# Patient Record
Sex: FEMALE | Race: BLACK OR AFRICAN AMERICAN | NOT HISPANIC OR LATINO | URBAN - METROPOLITAN AREA
[De-identification: names, ages, dates, MRNs, and addresses within clinical notes are randomized per-mention and may not be internally consistent; named-entity substitution may affect disease eponyms.]

---

## 2019-02-20 ENCOUNTER — EMERGENCY (EMERGENCY)
Facility: HOSPITAL | Age: 59
LOS: 1 days | Discharge: HOME | End: 2019-02-20
Attending: EMERGENCY MEDICINE | Admitting: EMERGENCY MEDICINE
Payer: COMMERCIAL

## 2019-02-20 VITALS
HEART RATE: 71 BPM | TEMPERATURE: 98 F | WEIGHT: 199.96 LBS | SYSTOLIC BLOOD PRESSURE: 118 MMHG | OXYGEN SATURATION: 99 % | DIASTOLIC BLOOD PRESSURE: 87 MMHG | HEIGHT: 62 IN | RESPIRATION RATE: 16 BRPM

## 2019-02-20 VITALS
HEART RATE: 75 BPM | SYSTOLIC BLOOD PRESSURE: 150 MMHG | TEMPERATURE: 98 F | OXYGEN SATURATION: 100 % | RESPIRATION RATE: 18 BRPM | DIASTOLIC BLOOD PRESSURE: 89 MMHG

## 2019-02-20 DIAGNOSIS — R10.84 GENERALIZED ABDOMINAL PAIN: ICD-10-CM

## 2019-02-20 DIAGNOSIS — Z79.891 LONG TERM (CURRENT) USE OF OPIATE ANALGESIC: ICD-10-CM

## 2019-02-20 DIAGNOSIS — Z79.82 LONG TERM (CURRENT) USE OF ASPIRIN: ICD-10-CM

## 2019-02-20 DIAGNOSIS — J44.9 CHRONIC OBSTRUCTIVE PULMONARY DISEASE, UNSPECIFIED: ICD-10-CM

## 2019-02-20 DIAGNOSIS — S39.91XD: Chronic | ICD-10-CM

## 2019-02-20 DIAGNOSIS — K80.20 CALCULUS OF GALLBLADDER WITHOUT CHOLECYSTITIS WITHOUT OBSTRUCTION: ICD-10-CM

## 2019-02-20 DIAGNOSIS — R94.5 ABNORMAL RESULTS OF LIVER FUNCTION STUDIES: ICD-10-CM

## 2019-02-20 DIAGNOSIS — Z79.899 OTHER LONG TERM (CURRENT) DRUG THERAPY: ICD-10-CM

## 2019-02-20 LAB
ALBUMIN SERPL ELPH-MCNC: 4.9 G/DL — SIGNIFICANT CHANGE UP (ref 3.3–5)
ALP SERPL-CCNC: 160 U/L — HIGH (ref 40–120)
ALT FLD-CCNC: 651 U/L — HIGH (ref 10–45)
ANION GAP SERPL CALC-SCNC: 12 MMOL/L — SIGNIFICANT CHANGE UP (ref 5–17)
APPEARANCE UR: CLEAR — SIGNIFICANT CHANGE UP
APTT BLD: 28.1 SEC — SIGNIFICANT CHANGE UP (ref 27.5–36.3)
AST SERPL-CCNC: 564 U/L — HIGH (ref 10–40)
BACTERIA # UR AUTO: SIGNIFICANT CHANGE UP /HPF
BASOPHILS # BLD AUTO: 0.04 K/UL — SIGNIFICANT CHANGE UP (ref 0–0.2)
BASOPHILS NFR BLD AUTO: 0.6 % — SIGNIFICANT CHANGE UP (ref 0–2)
BILIRUB SERPL-MCNC: 0.8 MG/DL — SIGNIFICANT CHANGE UP (ref 0.2–1.2)
BILIRUB UR-MCNC: NEGATIVE — SIGNIFICANT CHANGE UP
BUN SERPL-MCNC: 10 MG/DL — SIGNIFICANT CHANGE UP (ref 7–23)
CALCIUM SERPL-MCNC: 9.6 MG/DL — SIGNIFICANT CHANGE UP (ref 8.4–10.5)
CHLORIDE SERPL-SCNC: 109 MMOL/L — HIGH (ref 96–108)
CO2 SERPL-SCNC: 22 MMOL/L — SIGNIFICANT CHANGE UP (ref 22–31)
COLOR SPEC: YELLOW — SIGNIFICANT CHANGE UP
CREAT SERPL-MCNC: 0.75 MG/DL — SIGNIFICANT CHANGE UP (ref 0.5–1.3)
DIFF PNL FLD: ABNORMAL
EOSINOPHIL # BLD AUTO: 0.19 K/UL — SIGNIFICANT CHANGE UP (ref 0–0.5)
EOSINOPHIL NFR BLD AUTO: 2.8 % — SIGNIFICANT CHANGE UP (ref 0–6)
EPI CELLS # UR: ABNORMAL /HPF (ref 0–5)
GLUCOSE SERPL-MCNC: 110 MG/DL — HIGH (ref 70–99)
GLUCOSE UR QL: NEGATIVE — SIGNIFICANT CHANGE UP
HCT VFR BLD CALC: 41.7 % — SIGNIFICANT CHANGE UP (ref 34.5–45)
HGB BLD-MCNC: 14.1 G/DL — SIGNIFICANT CHANGE UP (ref 11.5–15.5)
IMM GRANULOCYTES NFR BLD AUTO: 0.4 % — SIGNIFICANT CHANGE UP (ref 0–1.5)
INR BLD: 1.01 — SIGNIFICANT CHANGE UP (ref 0.88–1.16)
KETONES UR-MCNC: NEGATIVE — SIGNIFICANT CHANGE UP
LEUKOCYTE ESTERASE UR-ACNC: NEGATIVE — SIGNIFICANT CHANGE UP
LIDOCAIN IGE QN: 18 U/L — SIGNIFICANT CHANGE UP (ref 7–60)
LYMPHOCYTES # BLD AUTO: 1.79 K/UL — SIGNIFICANT CHANGE UP (ref 1–3.3)
LYMPHOCYTES # BLD AUTO: 26.2 % — SIGNIFICANT CHANGE UP (ref 13–44)
MCHC RBC-ENTMCNC: 30.1 PG — SIGNIFICANT CHANGE UP (ref 27–34)
MCHC RBC-ENTMCNC: 33.8 GM/DL — SIGNIFICANT CHANGE UP (ref 32–36)
MCV RBC AUTO: 88.9 FL — SIGNIFICANT CHANGE UP (ref 80–100)
MONOCYTES # BLD AUTO: 0.42 K/UL — SIGNIFICANT CHANGE UP (ref 0–0.9)
MONOCYTES NFR BLD AUTO: 6.1 % — SIGNIFICANT CHANGE UP (ref 2–14)
NEUTROPHILS # BLD AUTO: 4.36 K/UL — SIGNIFICANT CHANGE UP (ref 1.8–7.4)
NEUTROPHILS NFR BLD AUTO: 63.9 % — SIGNIFICANT CHANGE UP (ref 43–77)
NITRITE UR-MCNC: NEGATIVE — SIGNIFICANT CHANGE UP
NRBC # BLD: 0 /100 WBCS — SIGNIFICANT CHANGE UP (ref 0–0)
PH UR: 6 — SIGNIFICANT CHANGE UP (ref 5–8)
PLATELET # BLD AUTO: 217 K/UL — SIGNIFICANT CHANGE UP (ref 150–400)
POTASSIUM SERPL-MCNC: 4.6 MMOL/L — SIGNIFICANT CHANGE UP (ref 3.5–5.3)
POTASSIUM SERPL-SCNC: 4.6 MMOL/L — SIGNIFICANT CHANGE UP (ref 3.5–5.3)
PROT SERPL-MCNC: 8.4 G/DL — HIGH (ref 6–8.3)
PROT UR-MCNC: NEGATIVE MG/DL — SIGNIFICANT CHANGE UP
PROTHROM AB SERPL-ACNC: 11.4 SEC — SIGNIFICANT CHANGE UP (ref 10–12.9)
RBC # BLD: 4.69 M/UL — SIGNIFICANT CHANGE UP (ref 3.8–5.2)
RBC # FLD: 13.6 % — SIGNIFICANT CHANGE UP (ref 10.3–14.5)
RBC CASTS # UR COMP ASSIST: < 5 /HPF — SIGNIFICANT CHANGE UP
SODIUM SERPL-SCNC: 143 MMOL/L — SIGNIFICANT CHANGE UP (ref 135–145)
SP GR SPEC: 1.01 — SIGNIFICANT CHANGE UP (ref 1–1.03)
UROBILINOGEN FLD QL: 0.2 E.U./DL — SIGNIFICANT CHANGE UP
WBC # BLD: 6.83 K/UL — SIGNIFICANT CHANGE UP (ref 3.8–10.5)
WBC # FLD AUTO: 6.83 K/UL — SIGNIFICANT CHANGE UP (ref 3.8–10.5)
WBC UR QL: < 5 /HPF — SIGNIFICANT CHANGE UP

## 2019-02-20 PROCEDURE — 74177 CT ABD & PELVIS W/CONTRAST: CPT | Mod: 26

## 2019-02-20 PROCEDURE — 80053 COMPREHEN METABOLIC PANEL: CPT

## 2019-02-20 PROCEDURE — 74177 CT ABD & PELVIS W/CONTRAST: CPT

## 2019-02-20 PROCEDURE — 85610 PROTHROMBIN TIME: CPT

## 2019-02-20 PROCEDURE — 81001 URINALYSIS AUTO W/SCOPE: CPT

## 2019-02-20 PROCEDURE — 99285 EMERGENCY DEPT VISIT HI MDM: CPT | Mod: 25

## 2019-02-20 PROCEDURE — 96374 THER/PROPH/DIAG INJ IV PUSH: CPT | Mod: XU

## 2019-02-20 PROCEDURE — 83690 ASSAY OF LIPASE: CPT

## 2019-02-20 PROCEDURE — 96375 TX/PRO/DX INJ NEW DRUG ADDON: CPT

## 2019-02-20 PROCEDURE — 36415 COLL VENOUS BLD VENIPUNCTURE: CPT

## 2019-02-20 PROCEDURE — 85025 COMPLETE CBC W/AUTO DIFF WBC: CPT

## 2019-02-20 PROCEDURE — 99284 EMERGENCY DEPT VISIT MOD MDM: CPT | Mod: 25

## 2019-02-20 PROCEDURE — 80074 ACUTE HEPATITIS PANEL: CPT

## 2019-02-20 PROCEDURE — 93005 ELECTROCARDIOGRAM TRACING: CPT

## 2019-02-20 PROCEDURE — 93010 ELECTROCARDIOGRAM REPORT: CPT

## 2019-02-20 PROCEDURE — 85730 THROMBOPLASTIN TIME PARTIAL: CPT

## 2019-02-20 PROCEDURE — 94640 AIRWAY INHALATION TREATMENT: CPT

## 2019-02-20 RX ORDER — ONDANSETRON 8 MG/1
4 TABLET, FILM COATED ORAL ONCE
Qty: 0 | Refills: 0 | Status: COMPLETED | OUTPATIENT
Start: 2019-02-20 | End: 2019-02-20

## 2019-02-20 RX ORDER — IPRATROPIUM/ALBUTEROL SULFATE 18-103MCG
3 AEROSOL WITH ADAPTER (GRAM) INHALATION EVERY 6 HOURS
Qty: 0 | Refills: 0 | Status: DISCONTINUED | OUTPATIENT
Start: 2019-02-20 | End: 2019-02-24

## 2019-02-20 RX ORDER — FAMOTIDINE 10 MG/ML
20 INJECTION INTRAVENOUS ONCE
Qty: 0 | Refills: 0 | Status: COMPLETED | OUTPATIENT
Start: 2019-02-20 | End: 2019-02-20

## 2019-02-20 RX ORDER — ALBUTEROL 90 UG/1
3 AEROSOL, METERED ORAL
Qty: 1 | Refills: 0
Start: 2019-02-20 | End: 2019-03-21

## 2019-02-20 RX ORDER — IPRATROPIUM/ALBUTEROL SULFATE 18-103MCG
3 AEROSOL WITH ADAPTER (GRAM) INHALATION ONCE
Qty: 0 | Refills: 0 | Status: COMPLETED | OUTPATIENT
Start: 2019-02-20 | End: 2019-02-20

## 2019-02-20 RX ORDER — TIOTROPIUM BROMIDE 18 UG/1
1 CAPSULE ORAL; RESPIRATORY (INHALATION) DAILY
Qty: 0 | Refills: 0 | Status: DISCONTINUED | OUTPATIENT
Start: 2019-02-20 | End: 2019-02-24

## 2019-02-20 RX ORDER — ALBUTEROL 90 UG/1
1 AEROSOL, METERED ORAL EVERY 4 HOURS
Qty: 0 | Refills: 0 | Status: DISCONTINUED | OUTPATIENT
Start: 2019-02-20 | End: 2019-02-24

## 2019-02-20 RX ORDER — FLUTICASONE PROPIONATE AND SALMETEROL 50; 250 UG/1; UG/1
1 POWDER ORAL; RESPIRATORY (INHALATION)
Qty: 1 | Refills: 0
Start: 2019-02-20 | End: 2019-03-21

## 2019-02-20 RX ORDER — SODIUM CHLORIDE 9 MG/ML
1000 INJECTION INTRAMUSCULAR; INTRAVENOUS; SUBCUTANEOUS ONCE
Qty: 0 | Refills: 0 | Status: COMPLETED | OUTPATIENT
Start: 2019-02-20 | End: 2019-02-20

## 2019-02-20 RX ORDER — IOHEXOL 300 MG/ML
30 INJECTION, SOLUTION INTRAVENOUS ONCE
Qty: 0 | Refills: 0 | Status: COMPLETED | OUTPATIENT
Start: 2019-02-20 | End: 2019-02-20

## 2019-02-20 RX ADMIN — SODIUM CHLORIDE 1000 MILLILITER(S): 9 INJECTION INTRAMUSCULAR; INTRAVENOUS; SUBCUTANEOUS at 14:12

## 2019-02-20 RX ADMIN — IOHEXOL 30 MILLILITER(S): 300 INJECTION, SOLUTION INTRAVENOUS at 14:12

## 2019-02-20 RX ADMIN — FAMOTIDINE 20 MILLIGRAM(S): 10 INJECTION INTRAVENOUS at 14:14

## 2019-02-20 RX ADMIN — Medication 3 MILLILITER(S): at 17:10

## 2019-02-20 RX ADMIN — ONDANSETRON 4 MILLIGRAM(S): 8 TABLET, FILM COATED ORAL at 14:12
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
## 2019-02-20 NOTE — ED ADULT NURSE NOTE - PMH
Chronic obstructive pulmonary disease, unspecified COPD type    Trigeminal neuralgia of left side of face
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
## 2019-02-20 NOTE — ED PROVIDER NOTE - PMH
Chronic obstructive pulmonary disease, unspecified COPD type Chronic obstructive pulmonary disease, unspecified COPD type    Trigeminal neuralgia of left side of face

## 2019-02-20 NOTE — ED ADULT NURSE NOTE - CHPI ED NUR SYMPTOMS NEG
no chills/no dysuria/no burning urination/no hematuria/no abdominal distension/no fever/no blood in stool

## 2019-02-20 NOTE — ED PROVIDER NOTE - CARE PLAN
Principal Discharge DX:	Generalized abdominal pain Principal Discharge DX:	Generalized abdominal pain  Secondary Diagnosis:	Elevated liver enzymes Principal Discharge DX:	Generalized abdominal pain  Secondary Diagnosis:	Elevated liver enzymes  Secondary Diagnosis:	Gallstones
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
## 2019-02-20 NOTE — ED PROVIDER NOTE - CLINICAL SUMMARY MEDICAL DECISION MAKING FREE TEXT BOX
59F PMH COPD presents with generalized abdominal pain, with one episode of vomiting yesterday. Abdnominal pain stable during ED visit, physical exam within normal limits. Recevied CT scan of abdomen which was unremarkable, significant only fo r 4mm pulmonary nodule. Able to tolerate food in ED. Discharged with albuterol refill. 59F PMH COPD presents with generalized abdominal pain, with one episode of vomiting yesterday. Abdominal pain stable during ED visit, physical exam within normal limits. Recevied CT scan of abdomen which was unremarkable, significant only fo r 4mm pulmonary nodule. Able to tolerate food in ED. Discharged with albuterol refill 59F PMH COPD presents with generalized abdominal pain, with one episode of vomiting yesterday. Abdominal pain stable during ED visit, physical exam within normal limits. Recevied CT scan of abdomen which demonstated gallstones no intrahepatic ductal dilitation- normal pancreas, and right LL 4mm pulmonary nodule. Able to tolerate food in ED -no N/V --Discharged with albuterol refill

## 2019-02-20 NOTE — ED PROVIDER NOTE - PROGRESS NOTE DETAILS
elev LFTS lipase and T bili negative-  hx of prior heavy etoh use  quit 3 years ago-- hepatitis panel negative- unclear etiology > food bourne ate salmon a few days earlier- pt aware of 4 mm pulm nodule- and need for follow up with her PMD for re-eval elev LFTS lipase and T bili negative-  hx of prior heavy etoh use  quit 3 years ago-- hepatitis panel negative- unclear etiology- ? food bourne ate salmon a few days earlier- but no diarrhea elev LFTS-- lipase and T bili wnl  - gallstones on CT noted  no wall thickening or pericholcystic  fluid---  hx of prior heavy etoh use  quit 3 years ago-- hepatitis panel negative- unclear etiology- ? food bourne ate salmon a few days earlier- but no diarrhea

## 2019-02-21 ENCOUNTER — EMERGENCY (EMERGENCY)
Facility: HOSPITAL | Age: 59
LOS: 1 days | Discharge: HOME | End: 2019-02-21
Attending: EMERGENCY MEDICINE | Admitting: EMERGENCY MEDICINE
Payer: MEDICAID

## 2019-02-21 VITALS
OXYGEN SATURATION: 98 % | TEMPERATURE: 98 F | HEART RATE: 70 BPM | DIASTOLIC BLOOD PRESSURE: 78 MMHG | SYSTOLIC BLOOD PRESSURE: 115 MMHG | RESPIRATION RATE: 16 BRPM

## 2019-02-21 VITALS
OXYGEN SATURATION: 98 % | RESPIRATION RATE: 16 BRPM | DIASTOLIC BLOOD PRESSURE: 80 MMHG | TEMPERATURE: 98 F | SYSTOLIC BLOOD PRESSURE: 120 MMHG | HEART RATE: 72 BPM

## 2019-02-21 DIAGNOSIS — Z79.899 OTHER LONG TERM (CURRENT) DRUG THERAPY: ICD-10-CM

## 2019-02-21 DIAGNOSIS — R94.5 ABNORMAL RESULTS OF LIVER FUNCTION STUDIES: ICD-10-CM

## 2019-02-21 DIAGNOSIS — Z79.891 LONG TERM (CURRENT) USE OF OPIATE ANALGESIC: ICD-10-CM

## 2019-02-21 DIAGNOSIS — J44.9 CHRONIC OBSTRUCTIVE PULMONARY DISEASE, UNSPECIFIED: ICD-10-CM

## 2019-02-21 DIAGNOSIS — S39.91XD: Chronic | ICD-10-CM

## 2019-02-21 DIAGNOSIS — Z79.82 LONG TERM (CURRENT) USE OF ASPIRIN: ICD-10-CM

## 2019-02-21 DIAGNOSIS — R53.1 WEAKNESS: ICD-10-CM

## 2019-02-21 DIAGNOSIS — K80.20 CALCULUS OF GALLBLADDER WITHOUT CHOLECYSTITIS WITHOUT OBSTRUCTION: ICD-10-CM

## 2019-02-21 LAB
ALBUMIN SERPL ELPH-MCNC: 4.1 G/DL — SIGNIFICANT CHANGE UP (ref 3.3–5)
ALP SERPL-CCNC: 139 U/L — HIGH (ref 40–120)
ALT FLD-CCNC: 333 U/L — HIGH (ref 10–45)
ANION GAP SERPL CALC-SCNC: 11 MMOL/L — SIGNIFICANT CHANGE UP (ref 5–17)
APTT BLD: 28.7 SEC — SIGNIFICANT CHANGE UP (ref 27.5–36.3)
AST SERPL-CCNC: 153 U/L — HIGH (ref 10–40)
BASOPHILS # BLD AUTO: 0.04 K/UL — SIGNIFICANT CHANGE UP (ref 0–0.2)
BASOPHILS NFR BLD AUTO: 0.7 % — SIGNIFICANT CHANGE UP (ref 0–2)
BILIRUB SERPL-MCNC: 0.4 MG/DL — SIGNIFICANT CHANGE UP (ref 0.2–1.2)
BUN SERPL-MCNC: 10 MG/DL — SIGNIFICANT CHANGE UP (ref 7–23)
CALCIUM SERPL-MCNC: 9.3 MG/DL — SIGNIFICANT CHANGE UP (ref 8.4–10.5)
CHLORIDE SERPL-SCNC: 107 MMOL/L — SIGNIFICANT CHANGE UP (ref 96–108)
CO2 SERPL-SCNC: 22 MMOL/L — SIGNIFICANT CHANGE UP (ref 22–31)
CREAT SERPL-MCNC: 0.79 MG/DL — SIGNIFICANT CHANGE UP (ref 0.5–1.3)
EOSINOPHIL # BLD AUTO: 0.4 K/UL — SIGNIFICANT CHANGE UP (ref 0–0.5)
EOSINOPHIL NFR BLD AUTO: 6.8 % — HIGH (ref 0–6)
GLUCOSE SERPL-MCNC: 99 MG/DL — SIGNIFICANT CHANGE UP (ref 70–99)
HCT VFR BLD CALC: 36.6 % — SIGNIFICANT CHANGE UP (ref 34.5–45)
HGB BLD-MCNC: 12.1 G/DL — SIGNIFICANT CHANGE UP (ref 11.5–15.5)
IMM GRANULOCYTES NFR BLD AUTO: 0.2 % — SIGNIFICANT CHANGE UP (ref 0–1.5)
INR BLD: 1.02 — SIGNIFICANT CHANGE UP (ref 0.88–1.16)
LYMPHOCYTES # BLD AUTO: 2.43 K/UL — SIGNIFICANT CHANGE UP (ref 1–3.3)
LYMPHOCYTES # BLD AUTO: 41.3 % — SIGNIFICANT CHANGE UP (ref 13–44)
MCHC RBC-ENTMCNC: 30 PG — SIGNIFICANT CHANGE UP (ref 27–34)
MCHC RBC-ENTMCNC: 33.1 GM/DL — SIGNIFICANT CHANGE UP (ref 32–36)
MCV RBC AUTO: 90.6 FL — SIGNIFICANT CHANGE UP (ref 80–100)
MONOCYTES # BLD AUTO: 0.52 K/UL — SIGNIFICANT CHANGE UP (ref 0–0.9)
MONOCYTES NFR BLD AUTO: 8.8 % — SIGNIFICANT CHANGE UP (ref 2–14)
NEUTROPHILS # BLD AUTO: 2.48 K/UL — SIGNIFICANT CHANGE UP (ref 1.8–7.4)
NEUTROPHILS NFR BLD AUTO: 42.2 % — LOW (ref 43–77)
NRBC # BLD: 0 /100 WBCS — SIGNIFICANT CHANGE UP (ref 0–0)
PLATELET # BLD AUTO: 172 K/UL — SIGNIFICANT CHANGE UP (ref 150–400)
POTASSIUM SERPL-MCNC: 4 MMOL/L — SIGNIFICANT CHANGE UP (ref 3.5–5.3)
POTASSIUM SERPL-SCNC: 4 MMOL/L — SIGNIFICANT CHANGE UP (ref 3.5–5.3)
PROT SERPL-MCNC: 6.8 G/DL — SIGNIFICANT CHANGE UP (ref 6–8.3)
PROTHROM AB SERPL-ACNC: 11.5 SEC — SIGNIFICANT CHANGE UP (ref 10–12.9)
RBC # BLD: 4.04 M/UL — SIGNIFICANT CHANGE UP (ref 3.8–5.2)
RBC # FLD: 13.7 % — SIGNIFICANT CHANGE UP (ref 10.3–14.5)
SODIUM SERPL-SCNC: 140 MMOL/L — SIGNIFICANT CHANGE UP (ref 135–145)
WBC # BLD: 5.88 K/UL — SIGNIFICANT CHANGE UP (ref 3.8–10.5)
WBC # FLD AUTO: 5.88 K/UL — SIGNIFICANT CHANGE UP (ref 3.8–10.5)

## 2019-02-21 PROCEDURE — 85730 THROMBOPLASTIN TIME PARTIAL: CPT

## 2019-02-21 PROCEDURE — 85025 COMPLETE CBC W/AUTO DIFF WBC: CPT

## 2019-02-21 PROCEDURE — 76705 ECHO EXAM OF ABDOMEN: CPT

## 2019-02-21 PROCEDURE — 80053 COMPREHEN METABOLIC PANEL: CPT

## 2019-02-21 PROCEDURE — 99284 EMERGENCY DEPT VISIT MOD MDM: CPT

## 2019-02-21 PROCEDURE — 85610 PROTHROMBIN TIME: CPT

## 2019-02-21 PROCEDURE — 76705 ECHO EXAM OF ABDOMEN: CPT | Mod: 26

## 2019-02-21 PROCEDURE — 99284 EMERGENCY DEPT VISIT MOD MDM: CPT | Mod: 25

## 2019-02-21 RX ORDER — SENNA PLUS 8.6 MG/1
0 TABLET ORAL
Qty: 0 | Refills: 0 | COMMUNITY

## 2019-02-21 RX ORDER — LISINOPRIL 2.5 MG/1
1 TABLET ORAL
Qty: 0 | Refills: 0 | COMMUNITY

## 2019-02-21 RX ORDER — GABAPENTIN 400 MG/1
1 CAPSULE ORAL
Qty: 0 | Refills: 0 | COMMUNITY

## 2019-02-21 RX ORDER — GABAPENTIN 400 MG/1
0 CAPSULE ORAL
Qty: 0 | Refills: 0 | COMMUNITY

## 2019-02-21 NOTE — ED ADULT NURSE NOTE - OBJECTIVE STATEMENT
c/o chronic generalized weakness accompanied RLQ pain with n/v/d x 4 days. Denies fever or chills, dysuria, or chest pain.
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
## 2019-02-21 NOTE — ED PROVIDER NOTE - CARE PROVIDERS DIRECT ADDRESSES
[email or web address]

## 2019-02-21 NOTE — ED ADULT NURSE NOTE - PMH
Chronic obstructive pulmonary disease, unspecified COPD type    Stroke    Trigeminal neuralgia of left side of face

## 2019-02-21 NOTE — ED PROVIDER NOTE - NSFOLLOWUPINSTRUCTIONS_ED_ALL_ED_FT
Biliary Colic, Adult  Biliary colic is severe pain caused by a problem with a small organ in the upper right part of your belly (gallbladder). The gallbladder stores a digestive fluid produced in the liver (bile) that helps the body break down fat. Bile and other digestive enzymes are carried from the liver to the small intestine through tube-like structures (bile ducts). The gallbladder and the bile ducts form the biliary tract.    ImageSometimes hard deposits of digestive fluids form in the gallbladder (gallstones) and block the flow of bile from the gallbladder, causing biliary colic. This condition is also called a gallbladder attack. Gallstones can be as small as a grain of sand or as big as a golf ball. There could be just one gallstone in the gallbladder, or there could be many.    What are the causes?  Biliary colic is usually caused by gallstones. Less often, a tumor could block the flow of bile from the gallbladder and trigger biliary colic.    What increases the risk?  This condition is more likely to develop in:    Women.  People of  descent.  People with a family history of gallstones.  People who are obese.  People who suddenly or quickly lose weight.  People who eat a high-calorie, low-fiber diet that is rich in refined carbs (carbohydrates), such as white bread and white rice.  People who have an intestinal disease that affects nutrient absorption, such as Crohn disease.  People who have a metabolic condition, such as metabolic syndrome or diabetes.    What are the signs or symptoms?  Severe pain in the upper right side of the belly is the main symptom of biliary colic. You may feel this pain below the chest but above the hip. This pain often occurs at night or after eating a very fatty meal. This pain may get worse for up to an hour and last as long as 12 hours. In most cases, the pain fades (subsides) within a couple hours.    Other symptoms of this condition include:    Nausea and vomiting.  Pain under the right shoulder.    How is this diagnosed?  This condition is diagnosed based on your medical history, your symptoms, and a physical exam. You may have tests, including:    Blood tests to rule out infection or inflammation of the bile ducts, gallbladder, pancreas, or liver.  Imaging studies such as:    Ultrasound.  CT scan.  MRI.      In some cases, you may need to have an imaging study done using a small amount of radioactive material (nuclear medicine) to confirm the diagnosis.    How is this treated?  Treatment for this condition may include medicine to relieve your pain or nausea. If you have gallstones that are causing biliary colic, you may need surgery to remove the gallbladder (cholecystectomy). Gallstones can also be dissolved gradually with medicine. It may take months or years before the gallstones are completely gone.    Follow these instructions at home:  Take over-the-counter and prescription medicines only as told by your health care provider.  Drink enough fluid to keep your urine clear or pale yellow.  Follow instructions from your health care provider about eating or drinking restrictions. These may include avoiding:    Fatty, greasy, and fried foods.  Any foods that make the pain worse.  Overeating.  Having a large meal after not eating for a while.    Keep all follow-up visits as told by your health care provider. This is important.  How is this prevented?  Steps to prevent this condition include:    Maintaining a healthy body weight.  Getting regular exercise.  Eating a healthy, high-fiber, low-fat diet.  Limiting how much sugar and refined carbs you eat, such as sweets, white flour, and white rice.    Contact a health care provider if:  Your pain lasts more than 5 hours.  You vomit.  You have a fever and chills.  Your pain gets worse.  Get help right away if:  Your skin or the whites of your eyes look yellow (jaundice).  Your have tea-colored urine and light-colored stools.  You are dizzy or you faint.  Summary  Biliary colic is severe pain caused by a problem with a small organ in the upper right part of your belly (gallbladder).  Treatments for this condition include medicines that relieves your pain or nausea and medicines that slowly dissolves the gallstones.  If gallstones cause your biliary colic, the treatment is surgery to remove the gallbladder (cholecystectomy).  This information is not intended to replace advice given to you by your health care provider. Make sure you discuss any questions you have with your health care provider.

## 2019-02-21 NOTE — ED ADULT TRIAGE NOTE - OTHER COMPLAINTS
pt c.o generalized weakness/fatigue. pt seen here last night. pt states "my doctor called me an hour ago and told me to come back because my kidney enzymes are elevated" c/o lower back pain.

## 2019-02-21 NOTE — ED POST DISCHARGE NOTE - RESULT SUMMARY
Elevated LFTS from ED visit 1 day ago- unclear etiology needs repeat enzymes and re-evaluation - had N/V  yesterday

## 2019-02-21 NOTE — CONSULT NOTE ADULT - ASSESSMENT
60 yo F with resolved gastroenteritis and incidental findings of cholelithiasis. Afebrile and hemodynamically stable. No leukocytosis and LFTs are trending down. US revealed cholelithiasis with no clinical sign of acute cholecystitis.     Recs :  No need surgical intervention for now  Please follow up with [facility] clinic to evaluate her gall bladder and for possible elective cholecystectomy  Low fat diet and lifestyle modification   Please come back if +fever, severe abdominal pain, nausea/vomiting    Plan d/w

## 2019-02-21 NOTE — ED ADULT NURSE NOTE - NSIMPLEMENTINTERV_GEN_ALL_ED
Implemented All Universal Safety Interventions:  [place] to call system. Call bell, personal items and telephone within reach. Instruct patient to call for assistance. Room bathroom lighting operational. Non-slip footwear when patient is off stretcher. Physically safe environment: no spills, clutter or unnecessary equipment. Stretcher in lowest position, wheels locked, appropriate side rails in place.
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
## 2019-02-21 NOTE — ED PROVIDER NOTE - CARE PROVIDER_API CALL
Nohemy Nation)  Surgery  [address], [facility] [place] [address]  Phone: [phone]  Fax: [phone]  Follow Up Time:     Greg Alvarez)  Surgery  [address], [facility] [place] [address]  Phone: [phone]  Fax: [phone]  Follow Up Time:
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
## 2019-03-04 ENCOUNTER — APPOINTMENT (OUTPATIENT)
Dept: SURGERY | Facility: CLINIC | Age: 59
End: 2019-03-04
Payer: MEDICAID

## 2019-03-04 PROCEDURE — 99203 OFFICE O/P NEW LOW 30 MIN: CPT

## 2019-03-05 RX ORDER — BACLOFEN 10 MG/1
10 TABLET ORAL
Refills: 0 | Status: ACTIVE | COMMUNITY

## 2019-03-06 PROBLEM — J44.9 CHRONIC OBSTRUCTIVE PULMONARY DISEASE, UNSPECIFIED: Chronic | Status: ACTIVE | Noted: 2019-02-20

## 2019-03-06 PROBLEM — I63.9 CEREBRAL INFARCTION, UNSPECIFIED: Chronic | Status: ACTIVE | Noted: 2019-02-21

## 2019-03-06 PROBLEM — G50.0 TRIGEMINAL NEURALGIA: Chronic | Status: ACTIVE | Noted: 2019-02-20

## 2019-03-15 ENCOUNTER — APPOINTMENT (OUTPATIENT)
Dept: INTERNAL MEDICINE | Facility: CLINIC | Age: 59
End: 2019-03-15
Payer: MEDICAID

## 2019-03-15 VITALS
BODY MASS INDEX: 36.62 KG/M2 | OXYGEN SATURATION: 98 % | HEART RATE: 78 BPM | TEMPERATURE: 97.6 F | HEIGHT: 62 IN | WEIGHT: 199 LBS

## 2019-03-15 PROCEDURE — 99204 OFFICE O/P NEW MOD 45 MIN: CPT

## 2019-03-15 RX ORDER — GABAPENTIN 300 MG/1
300 CAPSULE ORAL
Refills: 0 | Status: DISCONTINUED | COMMUNITY
End: 2019-03-15

## 2019-03-15 RX ORDER — TIOTROPIUM BROMIDE INHALATION SPRAY 3.12 UG/1
2.5 SPRAY, METERED RESPIRATORY (INHALATION) DAILY
Qty: 1 | Refills: 11 | Status: ACTIVE | COMMUNITY
Start: 2019-03-15

## 2019-03-15 RX ORDER — FLUTICASONE PROPIONATE AND SALMETEROL 50; 250 UG/1; UG/1
250-50 POWDER RESPIRATORY (INHALATION)
Qty: 1 | Refills: 5 | Status: ACTIVE | COMMUNITY
Start: 2019-03-15

## 2019-03-17 NOTE — PLAN
[FreeTextEntry1] : Multiple medical issues - REviewed need for medical record release to review previous testing and results and reports\par \par CVA - Memory issues - nuero referral, statin and asa\par Hx of pulmonary nodule- get ct scan for review\par HTN- stable on ACE - ocnintue\par HLD-cont statin\par COPD - will need new Pulm  cont spiriva and albuterol\par f/up 1 month

## 2019-03-17 NOTE — PHYSICAL EXAM
[No Acute Distress] : no acute distress [Well Nourished] : well nourished [Normal Oropharynx] : the oropharynx was normal [Well Developed] : well developed [Supple] : supple [No Lymphadenopathy] : no lymphadenopathy [Clear to Auscultation] : lungs were clear to auscultation bilaterally [No Accessory Muscle Use] : no accessory muscle use [Regular Rhythm] : with a regular rhythm [Normal S1, S2] : normal S1 and S2 [No Murmur] : no murmur heard [No Edema] : there was no peripheral edema [Normal Affect] : the affect was normal
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
## 2019-03-17 NOTE — REVIEW OF SYSTEMS
[Dyspnea on Exertion] : dyspnea on exertion [Abdominal Pain] : abdominal pain [Joint Pain] : joint pain [Memory Loss] : memory loss [Unsteady Walking] : ataxia [Anxiety] : anxiety [Negative] : Respiratory
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
## 2019-04-05 NOTE — ASSESSMENT
[FreeTextEntry1] : Case discussed with attending surgeon. Patient is a 59 year old female who has potential mild gallbladder wall thickening with cholellithiasis seen on her recent abdominal ultrasound, there is no evidence of cholecystitis on CT scan around the same time. SHe has relief with lidocaine. The findings on her ultrasound are unlikely cause of her symptoms. She has been recommended with screening colonoscopy and a referral to Dr. Wadsworth for primary care. She will follow up with us in six months time frame. or sooner if needed. We discussed the signs and symptoms of gallbladder pathology potential related to stones. She expressed understanding. Notably greater than 50% of todays 30 minute initial visit was spent on counseling and coordination of her care.

## 2019-04-05 NOTE — PHYSICAL EXAM
[Normal Breath Sounds] : Normal breath sounds [Normal Heart Sounds] : normal heart sounds [Alert] : alert [Oriented to Person] : oriented to person [Oriented to Place] : oriented to place [Calm] : calm [Tender] : was nontender [Enlarged] : not enlarged [Purpura] : no purpura  [Petechiae] : no petechiae [de-identified] : NAD. Appropriate.Comfortable. [de-identified] : Pupils equal. No scleral icterus. NCAT. [de-identified] : Supple. No overt lymphadenopathy. No JVD. [de-identified] : Abdomen is soft, nontender and non distended. Do not appreciate any overt mass.\par  [de-identified] : multiple abdominal incisions and abdominal midline incisional scar

## 2019-04-15 ENCOUNTER — APPOINTMENT (OUTPATIENT)
Dept: INTERNAL MEDICINE | Facility: CLINIC | Age: 59
End: 2019-04-15
Payer: MEDICAID

## 2019-04-15 VITALS
TEMPERATURE: 98.6 F | OXYGEN SATURATION: 97 % | WEIGHT: 200 LBS | BODY MASS INDEX: 36.58 KG/M2 | DIASTOLIC BLOOD PRESSURE: 80 MMHG | HEART RATE: 77 BPM | SYSTOLIC BLOOD PRESSURE: 120 MMHG | RESPIRATION RATE: 14 BRPM

## 2019-04-15 PROCEDURE — 36415 COLL VENOUS BLD VENIPUNCTURE: CPT

## 2019-04-15 PROCEDURE — 99214 OFFICE O/P EST MOD 30 MIN: CPT

## 2019-04-15 RX ORDER — GABAPENTIN 100 MG/1
100 CAPSULE ORAL
Refills: 0 | Status: ACTIVE | COMMUNITY

## 2019-04-15 RX ORDER — BUDESONIDE AND FORMOTEROL FUMARATE DIHYDRATE 160; 4.5 UG/1; UG/1
AEROSOL RESPIRATORY (INHALATION)
Refills: 0 | Status: ACTIVE | COMMUNITY
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
## 2019-04-15 NOTE — PHYSICAL EXAM
[No Acute Distress] : no acute distress [Well Nourished] : well nourished [Well Developed] : well developed [Normal Oropharynx] : the oropharynx was normal [No Lymphadenopathy] : no lymphadenopathy [Supple] : supple [Clear to Auscultation] : lungs were clear to auscultation bilaterally [Regular Rhythm] : with a regular rhythm [No Accessory Muscle Use] : no accessory muscle use [Normal S1, S2] : normal S1 and S2 [No Murmur] : no murmur heard [Normal Affect] : the affect was normal [No Edema] : there was no peripheral edema

## 2019-04-15 NOTE — PLAN
[FreeTextEntry1] : Multiple medical issues - Awaiting Medical records from previous PMD\par \par CVA - Memory issues - continue statin and asa\par - f/up neuro\par  MRI reviewed -no new changes\par Hx of pulmonary nodule- get ct scan for review\par HTN- stable on ACE - conintue\par HLD-cont statin\par COPD - Stable does not want to see a specialist at this time - \par  - taking advair and spiriva\par  - cont  lisinopril\par Check labs today\par \par

## 2019-04-16 LAB
ALBUMIN SERPL ELPH-MCNC: 4.8 G/DL
ALP BLD-CCNC: 127 U/L
ALT SERPL-CCNC: 26 U/L
ANION GAP SERPL CALC-SCNC: 15 MMOL/L
APPEARANCE: CLEAR
AST SERPL-CCNC: 23 U/L
BASOPHILS # BLD AUTO: 0.07 K/UL
BASOPHILS NFR BLD AUTO: 1.3 %
BILIRUB SERPL-MCNC: 0.3 MG/DL
BILIRUBIN URINE: NEGATIVE
BLOOD URINE: NEGATIVE
BUN SERPL-MCNC: 13 MG/DL
CALCIUM SERPL-MCNC: 10 MG/DL
CHLORIDE SERPL-SCNC: 107 MMOL/L
CHOLEST SERPL-MCNC: 155 MG/DL
CHOLEST/HDLC SERPL: 3.6 RATIO
CO2 SERPL-SCNC: 20 MMOL/L
COLOR: YELLOW
CREAT SERPL-MCNC: 0.78 MG/DL
EOSINOPHIL # BLD AUTO: 0.34 K/UL
EOSINOPHIL NFR BLD AUTO: 6.4 %
ESTIMATED AVERAGE GLUCOSE: 117 MG/DL
GLUCOSE QUALITATIVE U: NEGATIVE
GLUCOSE SERPL-MCNC: 99 MG/DL
HBA1C MFR BLD HPLC: 5.7 %
HCT VFR BLD CALC: 41.5 %
HDLC SERPL-MCNC: 43 MG/DL
HGB BLD-MCNC: 13.8 G/DL
IMM GRANULOCYTES NFR BLD AUTO: 0.4 %
KETONES URINE: NEGATIVE
LDLC SERPL CALC-MCNC: 94 MG/DL
LEUKOCYTE ESTERASE URINE: NEGATIVE
LYMPHOCYTES # BLD AUTO: 2.11 K/UL
LYMPHOCYTES NFR BLD AUTO: 39.4 %
MAN DIFF?: NORMAL
MCHC RBC-ENTMCNC: 30.5 PG
MCHC RBC-ENTMCNC: 33.3 GM/DL
MCV RBC AUTO: 91.6 FL
MONOCYTES # BLD AUTO: 0.57 K/UL
MONOCYTES NFR BLD AUTO: 10.7 %
NEUTROPHILS # BLD AUTO: 2.24 K/UL
NEUTROPHILS NFR BLD AUTO: 41.8 %
NITRITE URINE: NEGATIVE
PH URINE: 5.5
PLATELET # BLD AUTO: 233 K/UL
POTASSIUM SERPL-SCNC: 4.2 MMOL/L
PROT SERPL-MCNC: 7.5 G/DL
PROTEIN URINE: NEGATIVE
RBC # BLD: 4.53 M/UL
RBC # FLD: 13.6 %
SODIUM SERPL-SCNC: 142 MMOL/L
SPECIFIC GRAVITY URINE: 1.02
TRIGL SERPL-MCNC: 90 MG/DL
UROBILINOGEN URINE: NORMAL
WBC # FLD AUTO: 5.35 K/UL

## 2019-05-03 ENCOUNTER — APPOINTMENT (OUTPATIENT)
Dept: GASTROENTEROLOGY | Facility: CLINIC | Age: 59
End: 2019-05-03
Payer: MEDICAID

## 2019-05-03 VITALS
BODY MASS INDEX: 36.95 KG/M2 | DIASTOLIC BLOOD PRESSURE: 80 MMHG | WEIGHT: 202 LBS | OXYGEN SATURATION: 97 % | RESPIRATION RATE: 14 BRPM | SYSTOLIC BLOOD PRESSURE: 111 MMHG | HEART RATE: 80 BPM

## 2019-05-03 DIAGNOSIS — R74.8 ABNORMAL LEVELS OF OTHER SERUM ENZYMES: ICD-10-CM

## 2019-05-03 PROCEDURE — 36415 COLL VENOUS BLD VENIPUNCTURE: CPT

## 2019-05-03 PROCEDURE — 99204 OFFICE O/P NEW MOD 45 MIN: CPT | Mod: 25

## 2019-05-03 RX ORDER — OMEPRAZOLE 40 MG/1
40 CAPSULE, DELAYED RELEASE ORAL DAILY
Qty: 30 | Refills: 2 | Status: ACTIVE | COMMUNITY
Start: 2019-05-03 | End: 1900-01-01

## 2019-05-03 NOTE — HISTORY OF PRESENT ILLNESS
[de-identified] : 59F with PMHx COPD, HLD, HTN, CVA (2017) referred by Dr. Wadsworth for CRC screening. \par \par Reports that abdominal pain in her right lower abdomen as well as general pain in her abdomen began 3 months ago. She used to have similar symptoms when she was younger during menstruation. Previously she often had mild nausea with vomiting, and indigestion but now occurs occasionally. \par Additionally she feels very fatigued and weak easily after doing just a little bit of activity. She reports she [place] a neurologist downtown (unsure of name) who oversees her care. \par She saw Dr. Alvarez in March, who said she has gallstones and may need surgery after procedures \par \par US(3/4/19): gallbladder lumen filled with stones and hepatic steatosis, CT scan(3/4/19): unremarkable\par \par Fhx: not that aware of\par never had colonoscopy, endoscopy 10 years ago (severe heartburn) \par Etoh: none\par Smoking: former, stopped after stroke\par Drug use: none\par NSAIDs: aspirin daily, no other use \par

## 2019-05-03 NOTE — ASSESSMENT
[FreeTextEntry1] : 59F with PMHx COPD, HLD, HTN, CVA (2017) referred by Dr. Wadsworth for CRC screening. \par \par Abdominal pain (most significant in RLQ) \par - H. pylori breath test \par -take enteric coated aspirin, if not already taking\par -schedule patient for EGD/colonoscopy, r/a/i/b discussed and patient agreeable\par \par Hepatic steatosis\par -likely due to metabolic syndrome (HDL 43mg/dl, BMI 36.95, HTN, Hgb A1C 5.7%)\par -advised patient to keep taking Lipitor regularly \par -exercise as much as tolerable and eat a healthy diet to initiate weight loss \par -eliminate sugars and processed foods \par \par Elevated alk phos ( 127 U/L)\par -GGT drawn today \par \par F/U post procedures\par \par Nadia Hale NP \par

## 2019-05-03 NOTE — PHYSICAL EXAM
[General Appearance - Alert] : alert [PERRL With Normal Accommodation] : pupils were equal in size, round, and reactive to light [Sclera] : the sclera and conjunctiva were normal [General Appearance - In No Acute Distress] : in no acute distress [General Appearance - Well Nourished] : well nourished [Outer Ear] : the ears and nose were normal in appearance [Oropharynx] : the oropharynx was normal [] : the neck was supple [Neck Appearance] : the appearance of the neck was normal [Respiration, Rhythm And Depth] : normal respiratory rhythm and effort [Heart Rate And Rhythm] : heart rate was normal and rhythm regular [Edema] : there was no peripheral edema [Abdomen Tenderness] : non-tender [Abdomen Soft] : soft [Bowel Sounds] : normal bowel sounds [Oriented To Time, Place, And Person] : oriented to person, place, and time [Abnormal Walk] : normal gait [FreeTextEntry1] : well healed horizontal scar on mid abdomen

## 2019-05-06 LAB
GGT SERPL-CCNC: 405 U/L
UREA BREATH TEST QL: NEGATIVE

## 2019-05-08 ENCOUNTER — RESULT CHARGE (OUTPATIENT)
Age: 59
End: 2019-05-08

## 2019-05-14 LAB
ANA PAT FLD IF-IMP: NORMAL
ANA SER IF-ACNC: ABNORMAL
HAV IGM SER QL: NONREACTIVE
HBV CORE IGG+IGM SER QL: REACTIVE
HBV SURFACE AB SER QL: REACTIVE
HBV SURFACE AG SER QL: NONREACTIVE
HCV AB SER QL: NONREACTIVE
HCV S/CO RATIO: 0.26 S/CO
HEPATITIS A IGG ANTIBODY: REACTIVE
MITOCHONDRIA AB SER IF-ACNC: NORMAL
SMOOTH MUSCLE AB SER QL IF: NORMAL

## 2019-05-17 ENCOUNTER — EMERGENCY (EMERGENCY)
Facility: HOSPITAL | Age: 59
LOS: 1 days | Discharge: HOME | End: 2019-05-17
Attending: EMERGENCY MEDICINE | Admitting: EMERGENCY MEDICINE
Payer: MEDICAID

## 2019-05-17 VITALS
SYSTOLIC BLOOD PRESSURE: 116 MMHG | HEART RATE: 79 BPM | RESPIRATION RATE: 18 BRPM | DIASTOLIC BLOOD PRESSURE: 73 MMHG | TEMPERATURE: 98 F | OXYGEN SATURATION: 98 %

## 2019-05-17 VITALS
DIASTOLIC BLOOD PRESSURE: 92 MMHG | OXYGEN SATURATION: 98 % | SYSTOLIC BLOOD PRESSURE: 139 MMHG | TEMPERATURE: 98 F | RESPIRATION RATE: 16 BRPM | HEART RATE: 72 BPM | WEIGHT: 199.96 LBS

## 2019-05-17 DIAGNOSIS — S39.91XD: Chronic | ICD-10-CM

## 2019-05-17 LAB
ALBUMIN SERPL ELPH-MCNC: 4.3 G/DL — SIGNIFICANT CHANGE UP (ref 3.3–5)
ALP SERPL-CCNC: 184 U/L — HIGH (ref 40–120)
ALT FLD-CCNC: 291 U/L — HIGH (ref 10–45)
ANION GAP SERPL CALC-SCNC: 13 MMOL/L — SIGNIFICANT CHANGE UP (ref 5–17)
APPEARANCE UR: CLEAR — SIGNIFICANT CHANGE UP
AST SERPL-CCNC: 153 U/L — HIGH (ref 10–40)
BASOPHILS # BLD AUTO: 0.06 K/UL — SIGNIFICANT CHANGE UP (ref 0–0.2)
BASOPHILS NFR BLD AUTO: 1.3 % — SIGNIFICANT CHANGE UP (ref 0–2)
BILIRUB SERPL-MCNC: 0.4 MG/DL — SIGNIFICANT CHANGE UP (ref 0.2–1.2)
BILIRUB UR-MCNC: NEGATIVE — SIGNIFICANT CHANGE UP
BUN SERPL-MCNC: 10 MG/DL — SIGNIFICANT CHANGE UP (ref 7–23)
CALCIUM SERPL-MCNC: 9.4 MG/DL — SIGNIFICANT CHANGE UP (ref 8.4–10.5)
CHLORIDE SERPL-SCNC: 108 MMOL/L — SIGNIFICANT CHANGE UP (ref 96–108)
CO2 SERPL-SCNC: 24 MMOL/L — SIGNIFICANT CHANGE UP (ref 22–31)
COLOR SPEC: YELLOW — SIGNIFICANT CHANGE UP
CREAT SERPL-MCNC: 0.77 MG/DL — SIGNIFICANT CHANGE UP (ref 0.5–1.3)
DIFF PNL FLD: NEGATIVE — SIGNIFICANT CHANGE UP
EOSINOPHIL # BLD AUTO: 0.17 K/UL — SIGNIFICANT CHANGE UP (ref 0–0.5)
EOSINOPHIL NFR BLD AUTO: 3.8 % — SIGNIFICANT CHANGE UP (ref 0–6)
GLUCOSE SERPL-MCNC: 107 MG/DL — HIGH (ref 70–99)
GLUCOSE UR QL: NEGATIVE — SIGNIFICANT CHANGE UP
HCT VFR BLD CALC: 42.4 % — SIGNIFICANT CHANGE UP (ref 34.5–45)
HGB BLD-MCNC: 14.1 G/DL — SIGNIFICANT CHANGE UP (ref 11.5–15.5)
IMM GRANULOCYTES NFR BLD AUTO: 0.2 % — SIGNIFICANT CHANGE UP (ref 0–1.5)
KETONES UR-MCNC: NEGATIVE — SIGNIFICANT CHANGE UP
LEUKOCYTE ESTERASE UR-ACNC: NEGATIVE — SIGNIFICANT CHANGE UP
LIDOCAIN IGE QN: 17 U/L — SIGNIFICANT CHANGE UP (ref 7–60)
LYMPHOCYTES # BLD AUTO: 1.56 K/UL — SIGNIFICANT CHANGE UP (ref 1–3.3)
LYMPHOCYTES # BLD AUTO: 35 % — SIGNIFICANT CHANGE UP (ref 13–44)
MCHC RBC-ENTMCNC: 29.8 PG — SIGNIFICANT CHANGE UP (ref 27–34)
MCHC RBC-ENTMCNC: 33.3 GM/DL — SIGNIFICANT CHANGE UP (ref 32–36)
MCV RBC AUTO: 89.6 FL — SIGNIFICANT CHANGE UP (ref 80–100)
MONOCYTES # BLD AUTO: 0.52 K/UL — SIGNIFICANT CHANGE UP (ref 0–0.9)
MONOCYTES NFR BLD AUTO: 11.7 % — SIGNIFICANT CHANGE UP (ref 2–14)
NEUTROPHILS # BLD AUTO: 2.14 K/UL — SIGNIFICANT CHANGE UP (ref 1.8–7.4)
NEUTROPHILS NFR BLD AUTO: 48 % — SIGNIFICANT CHANGE UP (ref 43–77)
NITRITE UR-MCNC: NEGATIVE — SIGNIFICANT CHANGE UP
NRBC # BLD: 0 /100 WBCS — SIGNIFICANT CHANGE UP (ref 0–0)
PH UR: 6.5 — SIGNIFICANT CHANGE UP (ref 5–8)
PLATELET # BLD AUTO: 236 K/UL — SIGNIFICANT CHANGE UP (ref 150–400)
POTASSIUM SERPL-MCNC: 4.2 MMOL/L — SIGNIFICANT CHANGE UP (ref 3.5–5.3)
POTASSIUM SERPL-SCNC: 4.2 MMOL/L — SIGNIFICANT CHANGE UP (ref 3.5–5.3)
PROT SERPL-MCNC: 7.5 G/DL — SIGNIFICANT CHANGE UP (ref 6–8.3)
PROT UR-MCNC: NEGATIVE MG/DL — SIGNIFICANT CHANGE UP
RBC # BLD: 4.73 M/UL — SIGNIFICANT CHANGE UP (ref 3.8–5.2)
RBC # FLD: 12.5 % — SIGNIFICANT CHANGE UP (ref 10.3–14.5)
SODIUM SERPL-SCNC: 145 MMOL/L — SIGNIFICANT CHANGE UP (ref 135–145)
SP GR SPEC: 1.01 — SIGNIFICANT CHANGE UP (ref 1–1.03)
UROBILINOGEN FLD QL: 0.2 E.U./DL — SIGNIFICANT CHANGE UP
WBC # BLD: 4.46 K/UL — SIGNIFICANT CHANGE UP (ref 3.8–10.5)
WBC # FLD AUTO: 4.46 K/UL — SIGNIFICANT CHANGE UP (ref 3.8–10.5)

## 2019-05-17 PROCEDURE — 96374 THER/PROPH/DIAG INJ IV PUSH: CPT | Mod: XU

## 2019-05-17 PROCEDURE — 80053 COMPREHEN METABOLIC PANEL: CPT

## 2019-05-17 PROCEDURE — 81003 URINALYSIS AUTO W/O SCOPE: CPT

## 2019-05-17 PROCEDURE — 87086 URINE CULTURE/COLONY COUNT: CPT

## 2019-05-17 PROCEDURE — 96361 HYDRATE IV INFUSION ADD-ON: CPT

## 2019-05-17 PROCEDURE — 99284 EMERGENCY DEPT VISIT MOD MDM: CPT | Mod: 25

## 2019-05-17 PROCEDURE — 74177 CT ABD & PELVIS W/CONTRAST: CPT | Mod: 26

## 2019-05-17 PROCEDURE — 99284 EMERGENCY DEPT VISIT MOD MDM: CPT

## 2019-05-17 PROCEDURE — 83690 ASSAY OF LIPASE: CPT

## 2019-05-17 PROCEDURE — 74177 CT ABD & PELVIS W/CONTRAST: CPT

## 2019-05-17 PROCEDURE — 85025 COMPLETE CBC W/AUTO DIFF WBC: CPT

## 2019-05-17 RX ORDER — ACETAMINOPHEN WITH CODEINE 300MG-30MG
1 TABLET ORAL ONCE
Refills: 0 | Status: DISCONTINUED | OUTPATIENT
Start: 2019-05-17 | End: 2019-05-17

## 2019-05-17 RX ORDER — ONDANSETRON 8 MG/1
4 TABLET, FILM COATED ORAL ONCE
Refills: 0 | Status: COMPLETED | OUTPATIENT
Start: 2019-05-17 | End: 2019-05-17

## 2019-05-17 RX ORDER — SODIUM CHLORIDE 9 MG/ML
1000 INJECTION INTRAMUSCULAR; INTRAVENOUS; SUBCUTANEOUS ONCE
Refills: 0 | Status: COMPLETED | OUTPATIENT
Start: 2019-05-17 | End: 2019-05-17

## 2019-05-17 RX ORDER — IOHEXOL 300 MG/ML
30 INJECTION, SOLUTION INTRAVENOUS ONCE
Refills: 0 | Status: COMPLETED | OUTPATIENT
Start: 2019-05-17 | End: 2019-05-17

## 2019-05-17 RX ADMIN — Medication 1 TABLET(S): at 15:37

## 2019-05-17 RX ADMIN — SODIUM CHLORIDE 1000 MILLILITER(S): 9 INJECTION INTRAMUSCULAR; INTRAVENOUS; SUBCUTANEOUS at 15:36

## 2019-05-17 RX ADMIN — Medication 1 TABLET(S): at 15:40

## 2019-05-17 RX ADMIN — SODIUM CHLORIDE 1000 MILLILITER(S): 9 INJECTION INTRAMUSCULAR; INTRAVENOUS; SUBCUTANEOUS at 13:11

## 2019-05-17 RX ADMIN — ONDANSETRON 4 MILLIGRAM(S): 8 TABLET, FILM COATED ORAL at 14:09

## 2019-05-17 RX ADMIN — IOHEXOL 30 MILLILITER(S): 300 INJECTION, SOLUTION INTRAVENOUS at 13:11
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
## 2019-05-17 NOTE — ED PROVIDER NOTE - CPE EDP ENMT NORM
Detail Level: Simple Quality 226: Preventive Care And Screening: Tobacco Use: Screening And Cessation Intervention: Patient screened for tobacco and is a smoker AND received Cessation Counseling Quality 431: Preventive Care And Screening: Unhealthy Alcohol Use - Screening: Patient screened for unhealthy alcohol use using a single question and scores 2 or greater episodes per year and brief intervention did not occur normal...

## 2019-05-17 NOTE — ED ADULT NURSE REASSESSMENT NOTE - NS ED NURSE REASSESS COMMENT FT1
Patient a/oX 3, c/o of abdominal pain and "weakness" , no nausea/vomitting/diarrhea.  Vital signs stable.  Left FA PIV #20 in place, all labs sent, no complications.  NSS 1 L bolus, Zofran 4mg IVP admibsitered w/ good effects.  CT scan done. Results and disposition pending.

## 2019-05-17 NOTE — ED ADULT NURSE NOTE - NSIMPLEMENTINTERV_GEN_ALL_ED
Implemented All Universal Safety Interventions:  [place] to call system. Call bell, personal items and telephone within reach. Instruct patient to call for assistance. Room bathroom lighting operational. Non-slip footwear when patient is off stretcher. Physically safe environment: no spills, clutter or unnecessary equipment. Stretcher in lowest position, wheels locked, appropriate side rails in place.
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
## 2019-05-17 NOTE — ED ADULT TRIAGE NOTE - CHIEF COMPLAINT QUOTE
c/o diffused abdominal pain x 2 months but got worse the last 5 days.  Was told she has cholecystitis 2 months ago.  Denies n/v/d, fever.

## 2019-05-17 NOTE — ED ADULT NURSE NOTE - CHPI ED NUR SYMPTOMS NEG
no nausea/no chills/no vomiting/no blood in stool/no burning urination/no abdominal distension/no dysuria/no fever/no diarrhea/no hematuria

## 2019-05-18 LAB
CULTURE RESULTS: SIGNIFICANT CHANGE UP
SPECIMEN SOURCE: SIGNIFICANT CHANGE UP

## 2019-05-21 DIAGNOSIS — R10.84 GENERALIZED ABDOMINAL PAIN: ICD-10-CM

## 2019-05-21 DIAGNOSIS — Z79.82 LONG TERM (CURRENT) USE OF ASPIRIN: ICD-10-CM

## 2019-05-21 DIAGNOSIS — Z79.891 LONG TERM (CURRENT) USE OF OPIATE ANALGESIC: ICD-10-CM

## 2019-05-21 DIAGNOSIS — Z79.899 OTHER LONG TERM (CURRENT) DRUG THERAPY: ICD-10-CM

## 2019-05-21 DIAGNOSIS — J44.9 CHRONIC OBSTRUCTIVE PULMONARY DISEASE, UNSPECIFIED: ICD-10-CM

## 2019-06-05 ENCOUNTER — APPOINTMENT (OUTPATIENT)
Dept: GASTROENTEROLOGY | Facility: HOSPITAL | Age: 59
End: 2019-06-05

## 2019-06-10 ENCOUNTER — APPOINTMENT (OUTPATIENT)
Dept: INTERNAL MEDICINE | Facility: CLINIC | Age: 59
End: 2019-06-10
Payer: MEDICAID

## 2019-06-10 VITALS
WEIGHT: 190 LBS | RESPIRATION RATE: 14 BRPM | TEMPERATURE: 98.3 F | OXYGEN SATURATION: 97 % | HEART RATE: 73 BPM | DIASTOLIC BLOOD PRESSURE: 88 MMHG | BODY MASS INDEX: 34.75 KG/M2 | SYSTOLIC BLOOD PRESSURE: 120 MMHG

## 2019-06-10 DIAGNOSIS — R53.81 OTHER MALAISE: ICD-10-CM

## 2019-06-10 DIAGNOSIS — R53.83 OTHER MALAISE: ICD-10-CM

## 2019-06-10 PROCEDURE — 99214 OFFICE O/P EST MOD 30 MIN: CPT

## 2019-06-10 NOTE — PLAN
[FreeTextEntry1] : Multiple medical issues - Awaiting Medical records from previous PMD\par Abd pain - strongly encourage f/up with Dr Menendez and proceed with both EGD and Colonoscopy\par CVA - Memory issues - continue statin and asa\par - f/up neuro\par Depression - not interested in medication - will monitor\par Hx of pulmonary nodule- get ct scan for review - she will work on this\par HTN- stable on ACE - conintue\par HLD-cont statin\par COPD -  - taking advair and spiriva\par  - cont  lisinopril\par \par \par
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
## 2019-06-10 NOTE — PHYSICAL EXAM
[No Acute Distress] : no acute distress [Well Developed] : well developed [Well Nourished] : well nourished [Normal Oropharynx] : the oropharynx was normal [Supple] : supple [No Accessory Muscle Use] : no accessory muscle use [No Lymphadenopathy] : no lymphadenopathy [Clear to Auscultation] : lungs were clear to auscultation bilaterally [Regular Rhythm] : with a regular rhythm [Normal S1, S2] : normal S1 and S2 [No Edema] : there was no peripheral edema [No Murmur] : no murmur heard [Normal Affect] : the affect was normal

## 2019-06-10 NOTE — REVIEW OF SYSTEMS
[Abdominal Pain] : abdominal pain [Dyspnea on Exertion] : dyspnea on exertion [Joint Pain] : joint pain [Unsteady Walking] : ataxia [Memory Loss] : memory loss [Anxiety] : anxiety [Depression] : depression [Negative] : Genitourinary

## 2019-06-12 ENCOUNTER — MESSAGE (OUTPATIENT)
Age: 59
End: 2019-06-12

## 2019-07-10 ENCOUNTER — APPOINTMENT (OUTPATIENT)
Dept: GASTROENTEROLOGY | Facility: HOSPITAL | Age: 59
End: 2019-07-10

## 2019-07-10 ENCOUNTER — RESULT REVIEW (OUTPATIENT)
Age: 59
End: 2019-07-10

## 2019-07-10 ENCOUNTER — OUTPATIENT (OUTPATIENT)
Dept: OUTPATIENT SERVICES | Facility: HOSPITAL | Age: 59
LOS: 1 days | Discharge: HOME | End: 2019-07-10
Payer: MEDICAID

## 2019-07-10 DIAGNOSIS — S39.91XD: Chronic | ICD-10-CM

## 2019-07-10 PROCEDURE — 43239 EGD BIOPSY SINGLE/MULTIPLE: CPT

## 2019-07-10 PROCEDURE — 88305 TISSUE EXAM BY PATHOLOGIST: CPT

## 2019-07-10 PROCEDURE — 88341 IMHCHEM/IMCYTCHM EA ADD ANTB: CPT

## 2019-07-11 LAB — SURGICAL PATHOLOGY STUDY: SIGNIFICANT CHANGE UP

## 2019-07-17 ENCOUNTER — MED ADMIN CHARGE (OUTPATIENT)
Age: 59
End: 2019-07-17

## 2019-07-18 ENCOUNTER — RESULT REVIEW (OUTPATIENT)
Age: 59
End: 2019-07-18

## 2019-07-18 ENCOUNTER — MED ADMIN CHARGE (OUTPATIENT)
Age: 59
End: 2019-07-18

## 2019-07-18 DIAGNOSIS — A04.8 OTHER SPECIFIED BACTERIAL INTESTINAL INFECTIONS: ICD-10-CM

## 2019-07-18 RX ORDER — OMEPRAZOLE 20 MG/1
20 CAPSULE, DELAYED RELEASE ORAL TWICE DAILY
Qty: 28 | Refills: 0 | Status: ACTIVE | COMMUNITY
Start: 2019-07-18 | End: 1900-01-01

## 2019-08-14 ENCOUNTER — APPOINTMENT (OUTPATIENT)
Dept: INTERNAL MEDICINE | Facility: CLINIC | Age: 59
End: 2019-08-14
Payer: MEDICAID

## 2019-08-14 VITALS
HEART RATE: 96 BPM | OXYGEN SATURATION: 97 % | SYSTOLIC BLOOD PRESSURE: 130 MMHG | WEIGHT: 193 LBS | HEIGHT: 62 IN | DIASTOLIC BLOOD PRESSURE: 80 MMHG | RESPIRATION RATE: 14 BRPM | BODY MASS INDEX: 35.51 KG/M2 | TEMPERATURE: 97.7 F

## 2019-08-14 DIAGNOSIS — M25.562 PAIN IN RIGHT KNEE: ICD-10-CM

## 2019-08-14 DIAGNOSIS — M25.561 PAIN IN RIGHT KNEE: ICD-10-CM

## 2019-08-14 DIAGNOSIS — R91.1 SOLITARY PULMONARY NODULE: ICD-10-CM

## 2019-08-14 PROCEDURE — 99215 OFFICE O/P EST HI 40 MIN: CPT

## 2019-08-14 RX ORDER — ACETAMINOPHEN 325 MG/1
325 TABLET ORAL
Qty: 1 | Refills: 3 | Status: ACTIVE | COMMUNITY
Start: 2019-08-14 | End: 1900-01-01

## 2019-08-14 NOTE — HISTORY OF PRESENT ILLNESS
[FreeTextEntry1] : facial pain [de-identified] : \par \par She feels she is feeling significantly better since starting Nortriptyline by her pain management doctor.  Her mood is much better. She continues to have pain in her face though \par Gabapentin dosage is being titrated down.  Her facial burning is improving.\par She is also post tx for H Pylori and her abd pain has resolved\par In AM she still feels a little off, and it takes a few hours before she feels up for facing the day physically.  \par Knee pain is a significant issue as wel - Has an appt for xrays and a PT- ordered by her podiatrist.

## 2019-08-14 NOTE — REVIEW OF SYSTEMS
[Dyspnea on Exertion] : dyspnea on exertion [Abdominal Pain] : abdominal pain [Joint Pain] : joint pain [Memory Loss] : memory loss [Unsteady Walking] : ataxia [Anxiety] : anxiety [Depression] : depression [Negative] : Heme/Lymph

## 2019-08-14 NOTE — HISTORY OF PRESENT ILLNESS
[FreeTextEntry1] : facial pain [de-identified] : \par \par She feels she is feeling significantly better since starting Nortriptyline by her pain management doctor.  Her mood is much better. She continues to have pain in her face though \par Gabapentin dosage is being titrated down.  Her facial burning is improving.\par She is also post tx for H Pylori and her abd pain has resolved\par In AM she still feels a little off, and it takes a few hours before she feels up for facing the day physically.  \par Knee pain is a significant issue as wel - Has an appt for xrays and a PT- ordered by her podiatrist.

## 2019-08-14 NOTE — REVIEW OF SYSTEMS
[Dyspnea on Exertion] : dyspnea on exertion [Abdominal Pain] : abdominal pain [Joint Pain] : joint pain [Memory Loss] : memory loss [Anxiety] : anxiety [Unsteady Walking] : ataxia [Depression] : depression [Negative] : Heme/Lymph

## 2019-08-14 NOTE — PLAN
[FreeTextEntry1] : \par GI - f/up to confirm H pylori eradication and then for colonoscopy\par CVA - Memory issues - continue statin and asa\par - f/up neuro\par Depression - doing much better on nortriptyline - continue\par Hx of pulmonary nodule-reviewed old CT from 2016 - 5 mm nodule- never had a f/up ct scan - former heavy smoker- needs rpt ct \par HTN- stable on ACE - conintue\par HLD-cont statin\par COPD -  - taking advair and spiriva- continue\par Knee pain - referral for ortho and f/up xrays\par \par \par

## 2019-09-24 RX ORDER — ATORVASTATIN CALCIUM 40 MG/1
40 TABLET, FILM COATED ORAL
Qty: 30 | Refills: 11 | Status: ACTIVE | COMMUNITY
Start: 1900-01-01 | End: 1900-01-01

## 2019-10-02 ENCOUNTER — APPOINTMENT (OUTPATIENT)
Dept: GASTROENTEROLOGY | Facility: HOSPITAL | Age: 59
End: 2019-10-02

## 2019-12-18 ENCOUNTER — APPOINTMENT (OUTPATIENT)
Dept: INTERNAL MEDICINE | Facility: CLINIC | Age: 59
End: 2019-12-18
Payer: MEDICAID

## 2019-12-18 VITALS
BODY MASS INDEX: 39.01 KG/M2 | HEIGHT: 62 IN | TEMPERATURE: 98 F | DIASTOLIC BLOOD PRESSURE: 90 MMHG | HEART RATE: 77 BPM | SYSTOLIC BLOOD PRESSURE: 120 MMHG | OXYGEN SATURATION: 99 % | RESPIRATION RATE: 14 BRPM | WEIGHT: 212 LBS

## 2019-12-18 DIAGNOSIS — R05 COUGH: ICD-10-CM

## 2019-12-18 PROCEDURE — 99214 OFFICE O/P EST MOD 30 MIN: CPT

## 2019-12-18 RX ORDER — DEXTROMETHORPHAN POLISTIREX 30 MG/5ML
30 SUSPENSION, EXTENDED RELEASE ORAL
Qty: 1 | Refills: 0 | Status: ACTIVE | COMMUNITY
Start: 2019-12-18 | End: 1900-01-01

## 2019-12-18 NOTE — HISTORY OF PRESENT ILLNESS
[FreeTextEntry1] : facial pain [de-identified] : 61 yo f with hx of CVA, HTN. HLD, COPD, chronic left facial pain\par Here today for follow up\par Has not been able to follow up with Dr Mead yet for her H Pylori.  \par Reports a presistent cough.  Went to ER - given abx but it did not help.  Also given an asthma inhaler.   SLowly improving.   She continues to take symbicort daily.    She denies taking the spiriva.  \par Taking gabapentin and carbamazeine 3x/day to control her pain\par Has not had recent CT chest lung ca screening\par Has not had a mammogram\par Reports she did get a flu shot at the pharmacy.  \par Weight gain - up 20 lb.   \par \par

## 2019-12-18 NOTE — REVIEW OF SYSTEMS
[Fatigue] : fatigue [Recent Change In Weight] : ~T recent weight change [Cough] : cough [Abdominal Pain] : abdominal pain [Dyspnea on Exertion] : dyspnea on exertion [Joint Pain] : joint pain [Unsteady Walking] : ataxia [Memory Loss] : memory loss [Depression] : depression [Anxiety] : anxiety [Negative] : Heme/Lymph

## 2019-12-18 NOTE — PLAN
[FreeTextEntry1] : \par GI - f/up GI pending\par Facial pain - refill carbamazepine today\par Depression - stable\par Lung nodule- reviewed need for rpt CT, she wishes to wait until spring\par HTN- stable on ACE - conintue\par HLD-cont statin\par COPD -  - taking pulmicost and prn albuterol\par \par \par

## 2019-12-26 ENCOUNTER — EMERGENCY (EMERGENCY)
Facility: HOSPITAL | Age: 59
LOS: 1 days | Discharge: HOME | End: 2019-12-26
Attending: EMERGENCY MEDICINE | Admitting: EMERGENCY MEDICINE
Payer: MEDICAID

## 2019-12-26 ENCOUNTER — APPOINTMENT (OUTPATIENT)
Dept: GASTROENTEROLOGY | Facility: CLINIC | Age: 59
End: 2019-12-26
Payer: MEDICAID

## 2019-12-26 VITALS
SYSTOLIC BLOOD PRESSURE: 130 MMHG | WEIGHT: 201 LBS | BODY MASS INDEX: 36.99 KG/M2 | RESPIRATION RATE: 14 BRPM | HEART RATE: 69 BPM | DIASTOLIC BLOOD PRESSURE: 70 MMHG | OXYGEN SATURATION: 98 % | HEIGHT: 62 IN

## 2019-12-26 VITALS
RESPIRATION RATE: 18 BRPM | TEMPERATURE: 98 F | WEIGHT: 203.93 LBS | SYSTOLIC BLOOD PRESSURE: 136 MMHG | OXYGEN SATURATION: 100 % | HEART RATE: 54 BPM | DIASTOLIC BLOOD PRESSURE: 82 MMHG | HEIGHT: 62 IN

## 2019-12-26 VITALS
DIASTOLIC BLOOD PRESSURE: 70 MMHG | SYSTOLIC BLOOD PRESSURE: 110 MMHG | RESPIRATION RATE: 18 BRPM | HEART RATE: 67 BPM | TEMPERATURE: 98 F | OXYGEN SATURATION: 97 %

## 2019-12-26 DIAGNOSIS — S39.91XD: Chronic | ICD-10-CM

## 2019-12-26 PROCEDURE — 99284 EMERGENCY DEPT VISIT MOD MDM: CPT

## 2019-12-26 PROCEDURE — 71046 X-RAY EXAM CHEST 2 VIEWS: CPT

## 2019-12-26 PROCEDURE — 70450 CT HEAD/BRAIN W/O DYE: CPT

## 2019-12-26 PROCEDURE — 70450 CT HEAD/BRAIN W/O DYE: CPT | Mod: 26

## 2019-12-26 PROCEDURE — 99284 EMERGENCY DEPT VISIT MOD MDM: CPT | Mod: 25

## 2019-12-26 PROCEDURE — 93005 ELECTROCARDIOGRAM TRACING: CPT

## 2019-12-26 PROCEDURE — 71046 X-RAY EXAM CHEST 2 VIEWS: CPT | Mod: 26

## 2019-12-26 PROCEDURE — 93010 ELECTROCARDIOGRAM REPORT: CPT

## 2019-12-26 PROCEDURE — 99215 OFFICE O/P EST HI 40 MIN: CPT

## 2019-12-26 RX ORDER — GABAPENTIN 400 MG/1
600 CAPSULE ORAL ONCE
Refills: 0 | Status: COMPLETED | OUTPATIENT
Start: 2019-12-26 | End: 2019-12-26

## 2019-12-26 RX ORDER — ACETAMINOPHEN WITH CODEINE 300MG-30MG
2 TABLET ORAL ONCE
Refills: 0 | Status: DISCONTINUED | OUTPATIENT
Start: 2019-12-26 | End: 2019-12-26

## 2019-12-26 RX ORDER — SODIUM CHLORIDE 9 MG/ML
1000 INJECTION INTRAMUSCULAR; INTRAVENOUS; SUBCUTANEOUS ONCE
Refills: 0 | Status: COMPLETED | OUTPATIENT
Start: 2019-12-26 | End: 2019-12-26

## 2019-12-26 RX ADMIN — Medication 2 TABLET(S): at 13:31

## 2019-12-26 RX ADMIN — GABAPENTIN 600 MILLIGRAM(S): 400 CAPSULE ORAL at 16:22

## 2019-12-26 NOTE — ED PROVIDER NOTE - PROGRESS NOTE DETAILS
pt feeling well, unchanged ct, vss.  pt looks well.  discussed outpt need to fu with neurology.  attempted to call pcp dr. garcia but no answer.

## 2019-12-26 NOTE — ED PROVIDER NOTE - CARE PROVIDER_API CALL
Tabitha Castro)  Neurology; Vascular Neurology  [address], [address] [place] [address]  Phone: [phone]  Fax: [phone]  Follow Up Time: 7-10 Days

## 2019-12-26 NOTE — HISTORY OF PRESENT ILLNESS
[de-identified] : 59F with PMHx COPD, HLD, HTN, CVA (2017) referred by Dr. Wadsworth for CRC screening. \par 12/26/19\par - pt report she is feeling very unwell. describes symptoms similar to previous stroke as well as pain across chest and radiating down her left arm although not having it today\par - otherwise took antibiotics for h pylori and continues to be on PPI including today\par - EGD 7/2019= difuse gastritis + 2cm HH. Path = IM, mild chronic gastritis, acute/chronic esophagitis. + H PYLORI.\par - also noted to have elevated alk phos with +GGT and +DEYVI, immune to hepB due to previous infection\par - CT 2/2019=- nml liver, cholelithiasis, nml bowel\par - US 2/2019= moderate hepatic steatosis, cholelithiasis, ?GB wall thickening\par \par PREVIOUS HX\par Reports that abdominal pain in her right lower abdomen as well as general pain in her abdomen began 3 months ago. She used to have similar symptoms when she was younger during menstruation. Previously she often had mild nausea with vomiting, and indigestion but now occurs occasionally. \par Additionally she feels very fatigued and weak easily after doing just a little bit of activity. She reports she sees a neurologist downtown (unsure of name) who oversees her care. \par She saw Dr. Alvarez in March, who said she has gallstones and may need surgery after procedures \par \par US(3/4/19): gallbladder lumen filled with stones and hepatic steatosis, CT scan(3/4/19): unremarkable\par \par Fhx: not that aware of\par never had colonoscopy, endoscopy 10 years ago (severe heartburn) \par Etoh: none\par Smoking: former, stopped after stroke\par Drug use: none\par NSAIDs: aspirin daily, no other use \par

## 2019-12-26 NOTE — ED ADULT NURSE REASSESSMENT NOTE - NS ED NURSE REASSESS COMMENT FT1
Pt endorsed to IRENE Graves at 1330.  Pt currently refuses IV lab draw, after 1 attempt. Pt is alert and oriented x3.  Pt has multiple medical complaints.

## 2019-12-26 NOTE — ED PROVIDER NOTE - PATIENT PORTAL LINK FT
You can access the FollowMyHealth Patient Portal offered by [facility] by registering at the following website: [email or web address]. By joining [employer]’s FollowMyHealth portal, you will also be able to view your health information using other applications (apps) compatible with our system.

## 2019-12-26 NOTE — ED ADULT NURSE NOTE - NSIMPLEMENTINTERV_GEN_ALL_ED
Implemented All Fall Risk Interventions:  [place] to call system. Call bell, personal items and telephone within reach. Instruct patient to call for assistance. Room bathroom lighting operational. Non-slip footwear when patient is off stretcher. Physically safe environment: no spills, clutter or unnecessary equipment. Stretcher in lowest position, wheels locked, appropriate side rails in place. Provide visual cue, wrist band, yellow gown, etc. Monitor gait and stability. Monitor for mental status changes and reorient to person, place, and time. Review medications for side effects contributing to fall risk. Reinforce activity limits and safety measures with patient and family.

## 2019-12-26 NOTE — ED PROVIDER NOTE - CARE PLAN
Principal Discharge DX:	Trigeminal neuralgia of left side of face Principal Discharge DX:	Facial pain

## 2019-12-26 NOTE — ED PROVIDER NOTE - OBJECTIVE STATEMENT
59y F with history of stroke in 2017 having left sided burning since, heart disease, high cholesterol, COPD, and hypertension presents to the ED with complains of dizziness and pain in the left arm and back. Patient reports seeing GI doctor today who referred her to the ED because of current symptoms. 59y F with history of stroke in 2017 having left sided facial burning since, heart disease, high cholesterol, COPD, and hypertension presents to the ED with complains of dizziness and pain in the left arm and back. Patient reports seeing GI doctor today who referred her to the ED because of current symptoms. Patient currently takes gabapentin. 59y F with history of stroke in 2017 having left sided facial burning since, heart disease, high cholesterol, COPD, and hypertension presents to the ED with complaints of left face pain since her stroke in 2017 and pain in the left arm and back X few days which worsens with certain movements. Patient reports seeing GI doctor (Dr. Mead) today who referred her to the ED because of current symptoms. Patient currently takes gabapentin for her facial pain and in ED has a warm pack on her face. States that nothing helps with this chronic pain and is requesting pain meds. Denies fevers, chills, SOB, abd pain, N,V,D.

## 2019-12-26 NOTE — PHYSICAL EXAM
[General Appearance - Alert] : alert [General Appearance - In No Acute Distress] : in no acute distress [General Appearance - Well Nourished] : well nourished [PERRL With Normal Accommodation] : pupils were equal in size, round, and reactive to light [Sclera] : the sclera and conjunctiva were normal [Outer Ear] : the ears and nose were normal in appearance [Oropharynx] : the oropharynx was normal [Neck Appearance] : the appearance of the neck was normal [Heart Rate And Rhythm] : heart rate was normal and rhythm regular [Respiration, Rhythm And Depth] : normal respiratory rhythm and effort [Edema] : there was no peripheral edema [Bowel Sounds] : normal bowel sounds [Abdomen Soft] : soft [Abdomen Tenderness] : non-tender [Nail Clubbing] : no clubbing  or cyanosis of the fingernails [No CVA Tenderness] : no ~M costovertebral angle tenderness [] : no rash [FreeTextEntry1] : teary and emotional

## 2019-12-26 NOTE — ED ADULT TRIAGE NOTE - OTHER COMPLAINTS
biba from clinic c/o of weakness for weeks that has been getting worse, reports dizziness and tingling

## 2019-12-26 NOTE — ED ADULT NURSE NOTE - OBJECTIVE STATEMENT
PT came to ED complaining of weakness and multiple complaints of pain "everywhere". in chest, abd, eyes, nose, legs, back, feet.  Denies d/N/V. Denies SOB.  Pt is alert and oriented x3. Well appearing, ambulating at baseline gait.  No slurred speech, no facial droop. Ambulates with walker.

## 2019-12-26 NOTE — ASSESSMENT
[FreeTextEntry1] : 59F with PMHx COPD, HLD, HTN, CVA (2017) referred by Dr. Garcia for CRC screening now here to f/u after EGD with H pylori gastritis \par \par Neck discomfort/CVA concern and coronary disease concern\par - send to ED, referral given and ED called for notification STAT\par - dr garcia notified\par \par Abdominal pain (most significant in RLQ) \par - H. pylori eradication after off PPI x2weeks- can be done with endoscopy with KODI PROTOCOL biopsies\par - schedule egd/cscope after pt has been medically optimized given current active issues\par \par Hepatic steatosis with elevated ALK PHOS +GGT\par -likely due to metabolic syndrome (HDL 43mg/dl, BMI 36.95, HTN, Hgb A1C 5.7%)\par -advised patient to keep taking Lipitor regularly \par -exercise as much as tolerable and eat a healthy diet to initiate weight loss \par -eliminate soda/sugars and processed foods \par - f/u today's labs in ED and will need full liver workup including likely MRI/MRCP if alk phos consistently elevated\par \par f/u once acute issues resolved

## 2019-12-26 NOTE — ED PROVIDER NOTE - NSFOLLOWUPINSTRUCTIONS_ED_ALL_ED_FT
Trigeminal Neuralgia     Trigeminal neuralgia is a nerve disorder that causes attacks of severe facial pain. The attacks last from a few seconds to several minutes. They can happen for days, weeks, or months and then go away for months or years. Trigeminal neuralgia is also called tic douloureux.  What are the causes?  This condition is caused by damage to a nerve in the face that is called the trigeminal nerve. An attack can be triggered by:  Talking.Chewing.Putting on makeup.Washing your face.Shaving your face.Brushing your teeth.Touching your face.What increases the risk?  This condition is more likely to develop in:  Women.People who are 50 years of age or older.What are the signs or symptoms?  The main symptom of this condition is pain in the jaw, lips, eyes, nose, scalp, forehead, and face. The pain may be intense, stabbing, electric, or shock-like.  How is this diagnosed?  This condition is diagnosed with a physical exam. A CT scan or MRI may be done to rule out other conditions that can cause facial pain.  How is this treated?  This condition may be treated with:  Avoiding the things that trigger your attacks.Pain medicine.Surgery. This may be done in severe cases if other medical treatment does not provide relief.Follow these instructions at home:  Take over-the-counter and prescription medicines only as told by your health care provider.If you wish to get pregnant, talk with your health care provider before you start trying to get pregnant.Avoid the things that trigger your attacks. It may help to:  Chew on the unaffected side of your mouth.Avoid touching your face.Avoid blasts of hot or cold air.Contact a health care provider if:  Your pain medicine is not helping.You develop new, unexplained symptoms, such as:  Double vision.Facial weakness.Changes in hearing or balance.You become pregnant.Get help right away if:  Your pain is unbearable, and your pain medicine does not help.This information is not intended to replace advice given to you by your health care provider. Make sure you discuss any questions you have with your health care provider.

## 2019-12-30 DIAGNOSIS — M79.602 PAIN IN LEFT ARM: ICD-10-CM

## 2019-12-30 DIAGNOSIS — R42 DIZZINESS AND GIDDINESS: ICD-10-CM

## 2019-12-30 DIAGNOSIS — M54.9 DORSALGIA, UNSPECIFIED: ICD-10-CM

## 2019-12-30 DIAGNOSIS — R53.1 WEAKNESS: ICD-10-CM

## 2019-12-30 DIAGNOSIS — R51 HEADACHE: ICD-10-CM

## 2020-01-22 ENCOUNTER — APPOINTMENT (OUTPATIENT)
Dept: INTERNAL MEDICINE | Facility: CLINIC | Age: 60
End: 2020-01-22
Payer: MEDICAID

## 2020-01-22 VITALS
TEMPERATURE: 98 F | DIASTOLIC BLOOD PRESSURE: 82 MMHG | BODY MASS INDEX: 36.8 KG/M2 | RESPIRATION RATE: 14 BRPM | OXYGEN SATURATION: 97 % | HEIGHT: 62 IN | SYSTOLIC BLOOD PRESSURE: 134 MMHG | WEIGHT: 200 LBS | HEART RATE: 79 BPM

## 2020-01-22 DIAGNOSIS — R10.31 RIGHT LOWER QUADRANT PAIN: ICD-10-CM

## 2020-01-22 DIAGNOSIS — Z00.00 ENCOUNTER FOR GENERAL ADULT MEDICAL EXAMINATION W/OUT ABNORMAL FINDINGS: ICD-10-CM

## 2020-01-22 PROCEDURE — 99214 OFFICE O/P EST MOD 30 MIN: CPT

## 2020-01-22 PROCEDURE — 36415 COLL VENOUS BLD VENIPUNCTURE: CPT

## 2020-01-22 RX ORDER — CARBAMAZEPINE 200 MG/1
200 TABLET ORAL
Refills: 0 | Status: DISCONTINUED | COMMUNITY
End: 2020-01-22

## 2020-01-22 RX ORDER — CARBAMAZEPINE 200 MG/1
200 TABLET ORAL 3 TIMES DAILY
Qty: 90 | Refills: 5 | Status: DISCONTINUED | COMMUNITY
End: 2020-01-22

## 2020-01-22 NOTE — HISTORY OF PRESENT ILLNESS
[de-identified] : 60 yo f with hx of CVA, HTN, HLD, COPD, chronic facial pain\par She is s/p ER eval  - was sent directly from Dr Plummer office.\par SHe feels weak in her stomach. \par She describes a pain in her right lower abdomen\par Following with pain management on 2/10.  She stopped the carbamazepine.COntinue to take baclofen and gabapentin\par Reports her breathing has been fine using the 2 inhalers.\par Denies depression but does state life has been hard since the cva.\par

## 2020-01-22 NOTE — REVIEW OF SYSTEMS
[Fatigue] : fatigue [Dyspnea on Exertion] : dyspnea on exertion [Abdominal Pain] : abdominal pain [Constipation] : no constipation [Nausea] : no nausea [Heartburn] : no heartburn [Joint Pain] : joint pain [Melena] : no melena [Negative] : Psychiatric

## 2020-01-22 NOTE — PLAN
[FreeTextEntry1] : \par \par Abd pain - Strongly advise against NSAID tx\par  - use tylenol 500mg\par - plan to get EGD performed with Dr Mead \par \par with fatigue/dyspnea- send for cardiac evaluation before proceeding with egd and possibly a colonoscopy

## 2020-01-23 LAB
ALBUMIN SERPL ELPH-MCNC: 4.7 G/DL
ALP BLD-CCNC: 102 U/L
ALT SERPL-CCNC: 29 U/L
ANION GAP SERPL CALC-SCNC: 12 MMOL/L
APPEARANCE: CLEAR
AST SERPL-CCNC: 25 U/L
BASOPHILS # BLD AUTO: 0.06 K/UL
BASOPHILS NFR BLD AUTO: 0.9 %
BILIRUB SERPL-MCNC: 0.4 MG/DL
BILIRUBIN URINE: NEGATIVE
BLOOD URINE: NEGATIVE
BUN SERPL-MCNC: 11 MG/DL
CALCIUM SERPL-MCNC: 9.5 MG/DL
CHLORIDE SERPL-SCNC: 109 MMOL/L
CHOLEST SERPL-MCNC: 152 MG/DL
CHOLEST/HDLC SERPL: 3.4 RATIO
CO2 SERPL-SCNC: 23 MMOL/L
COLOR: YELLOW
CREAT SERPL-MCNC: 0.9 MG/DL
EOSINOPHIL # BLD AUTO: 0.4 K/UL
EOSINOPHIL NFR BLD AUTO: 6.3 %
ESTIMATED AVERAGE GLUCOSE: 114 MG/DL
GLUCOSE QUALITATIVE U: NEGATIVE
GLUCOSE SERPL-MCNC: 113 MG/DL
HBA1C MFR BLD HPLC: 5.6 %
HCT VFR BLD CALC: 39.3 %
HDLC SERPL-MCNC: 45 MG/DL
HGB BLD-MCNC: 12.4 G/DL
IMM GRANULOCYTES NFR BLD AUTO: 0.2 %
KETONES URINE: NEGATIVE
LDLC SERPL CALC-MCNC: 81 MG/DL
LEUKOCYTE ESTERASE URINE: NEGATIVE
LYMPHOCYTES # BLD AUTO: 2.57 K/UL
LYMPHOCYTES NFR BLD AUTO: 40.5 %
MAN DIFF?: NORMAL
MCHC RBC-ENTMCNC: 29.7 PG
MCHC RBC-ENTMCNC: 31.6 GM/DL
MCV RBC AUTO: 94.2 FL
MONOCYTES # BLD AUTO: 0.5 K/UL
MONOCYTES NFR BLD AUTO: 7.9 %
NEUTROPHILS # BLD AUTO: 2.81 K/UL
NEUTROPHILS NFR BLD AUTO: 44.2 %
NITRITE URINE: NEGATIVE
PH URINE: 7
PLATELET # BLD AUTO: 229 K/UL
POTASSIUM SERPL-SCNC: 4 MMOL/L
PROT SERPL-MCNC: 7 G/DL
PROTEIN URINE: NEGATIVE
RBC # BLD: 4.17 M/UL
RBC # FLD: 13.6 %
SODIUM SERPL-SCNC: 145 MMOL/L
SPECIFIC GRAVITY URINE: 1.01
TRIGL SERPL-MCNC: 129 MG/DL
UROBILINOGEN URINE: NORMAL
WBC # FLD AUTO: 6.35 K/UL

## 2020-01-24 RX ORDER — POLYETHYLENE GLYCOL 3350 17 G/17G
17 POWDER, FOR SOLUTION ORAL
Qty: 2 | Refills: 0 | Status: ACTIVE | COMMUNITY
Start: 2020-01-24 | End: 1900-01-01

## 2020-01-27 ENCOUNTER — APPOINTMENT (OUTPATIENT)
Dept: GASTROENTEROLOGY | Facility: HOSPITAL | Age: 60
End: 2020-01-27

## 2020-01-28 ENCOUNTER — RESULT CHARGE (OUTPATIENT)
Age: 60
End: 2020-01-28

## 2020-01-29 ENCOUNTER — APPOINTMENT (OUTPATIENT)
Dept: HEART AND VASCULAR | Facility: CLINIC | Age: 60
End: 2020-01-29
Payer: MEDICAID

## 2020-01-29 VITALS
HEIGHT: 62 IN | BODY MASS INDEX: 36.8 KG/M2 | WEIGHT: 200 LBS | SYSTOLIC BLOOD PRESSURE: 130 MMHG | DIASTOLIC BLOOD PRESSURE: 80 MMHG | HEART RATE: 70 BPM | OXYGEN SATURATION: 98 %

## 2020-01-29 DIAGNOSIS — Z82.49 FAMILY HISTORY OF ISCHEMIC HEART DISEASE AND OTHER DISEASES OF THE CIRCULATORY SYSTEM: ICD-10-CM

## 2020-01-29 DIAGNOSIS — R07.9 CHEST PAIN, UNSPECIFIED: ICD-10-CM

## 2020-01-29 DIAGNOSIS — Z87.891 PERSONAL HISTORY OF NICOTINE DEPENDENCE: ICD-10-CM

## 2020-01-29 PROCEDURE — 93306 TTE W/DOPPLER COMPLETE: CPT

## 2020-01-29 PROCEDURE — 93000 ELECTROCARDIOGRAM COMPLETE: CPT

## 2020-01-29 PROCEDURE — 99204 OFFICE O/P NEW MOD 45 MIN: CPT

## 2020-01-29 PROCEDURE — 93880 EXTRACRANIAL BILAT STUDY: CPT

## 2020-01-29 NOTE — REASON FOR VISIT
[Initial Evaluation] : an initial evaluation of [Chest Pain] : chest pain [Dyspnea] : dyspnea [FreeTextEntry1] : CVA

## 2020-01-29 NOTE — DISCUSSION/SUMMARY
[FreeTextEntry1] : CP/SOB- r/o CAD + risk f/u nuclear stress\par CVA- carotids minimal stenosis, cont secondary prevention

## 2020-01-29 NOTE — HISTORY OF PRESENT ILLNESS
[FreeTextEntry1] : sent by pcp for cardiac evaluation\par noted new LANTIGUA worsening past 2 months, also with associated chest discomforted\par moderate intensity\par non radiating\par dull\par relieved with rest\par lasts minutes

## 2020-01-29 NOTE — REVIEW OF SYSTEMS
[see HPI] : see HPI [Loss Of Hearing] : hearing loss [Dizziness] : dizziness [Joint Pain] : joint pain [Tingling (Paresthesia)] : tingling [Numbness (Hypesthesia)] : numbness [Negative] : Endocrine

## 2020-01-29 NOTE — PHYSICAL EXAM
[General Appearance - Well Developed] : well developed [Normal Appearance] : normal appearance [Well Groomed] : well groomed [General Appearance - Well Nourished] : well nourished [General Appearance - In No Acute Distress] : no acute distress [No Deformities] : no deformities [Normal Conjunctiva] : the conjunctiva exhibited no abnormalities [Eyelids - No Xanthelasma] : the eyelids demonstrated no xanthelasmas [Normal Oral Mucosa] : normal oral mucosa [No Oral Cyanosis] : no oral cyanosis [No Oral Pallor] : no oral pallor [Normal Jugular Venous V Waves Present] : normal jugular venous V waves present [Normal Jugular Venous A Waves Present] : normal jugular venous A waves present [No Jugular Venous Burton A Waves] : no jugular venous burton A waves [Respiration, Rhythm And Depth] : normal respiratory rhythm and effort [Exaggerated Use Of Accessory Muscles For Inspiration] : no accessory muscle use [Auscultation Breath Sounds / Voice Sounds] : lungs were clear to auscultation bilaterally [Heart Sounds] : normal S1 and S2 [Heart Rate And Rhythm] : heart rate and rhythm were normal [Abdomen Soft] : soft [Murmurs] : no murmurs present [Abdomen Tenderness] : non-tender [Gait - Sufficient For Exercise Testing] : the gait was sufficient for exercise testing [Abnormal Walk] : normal gait [Abdomen Mass (___ Cm)] : no abdominal mass palpated [Nail Clubbing] : no clubbing of the fingernails [Petechial Hemorrhages (___cm)] : no petechial hemorrhages [Cyanosis, Localized] : no localized cyanosis [] : no ischemic changes [Oriented To Time, Place, And Person] : oriented to person, place, and time [Mood] : the mood was normal [Affect] : the affect was normal [No Anxiety] : not feeling anxious

## 2020-02-06 ENCOUNTER — APPOINTMENT (OUTPATIENT)
Dept: HEART AND VASCULAR | Facility: CLINIC | Age: 60
End: 2020-02-06
Payer: MEDICAID

## 2020-02-06 DIAGNOSIS — R06.02 SHORTNESS OF BREATH: ICD-10-CM

## 2020-02-06 PROCEDURE — 99214 OFFICE O/P EST MOD 30 MIN: CPT | Mod: 25

## 2020-02-06 PROCEDURE — 93015 CV STRESS TEST SUPVJ I&R: CPT

## 2020-02-06 PROCEDURE — 78452 HT MUSCLE IMAGE SPECT MULT: CPT

## 2020-02-06 PROCEDURE — A9500: CPT

## 2020-02-06 NOTE — DISCUSSION/SUMMARY
[FreeTextEntry1] : sob- negative non invasive cardiac evaluation, results discussed, reassurance given\par HTN stable on meds\par Lipids cont statin

## 2020-02-06 NOTE — REASON FOR VISIT
[Follow-Up - Clinic] : a clinic follow-up of [Dyspnea] : dyspnea [Hypertension] : hypertension [Hyperlipidemia] : hyperlipidemia

## 2020-02-06 NOTE — PHYSICAL EXAM
[General Appearance - Well Developed] : well developed [Well Groomed] : well groomed [Normal Appearance] : normal appearance [No Deformities] : no deformities [General Appearance - Well Nourished] : well nourished [Normal Conjunctiva] : the conjunctiva exhibited no abnormalities [General Appearance - In No Acute Distress] : no acute distress [Eyelids - No Xanthelasma] : the eyelids demonstrated no xanthelasmas [Normal Oral Mucosa] : normal oral mucosa [No Oral Pallor] : no oral pallor [No Oral Cyanosis] : no oral cyanosis [Normal Jugular Venous A Waves Present] : normal jugular venous A waves present [Normal Jugular Venous V Waves Present] : normal jugular venous V waves present [No Jugular Venous Burton A Waves] : no jugular venous burton A waves [Exaggerated Use Of Accessory Muscles For Inspiration] : no accessory muscle use [Respiration, Rhythm And Depth] : normal respiratory rhythm and effort [Auscultation Breath Sounds / Voice Sounds] : lungs were clear to auscultation bilaterally [Heart Rate And Rhythm] : heart rate and rhythm were normal [Heart Sounds] : normal S1 and S2 [Murmurs] : no murmurs present [Abdomen Soft] : soft [Abdomen Tenderness] : non-tender [Abdomen Mass (___ Cm)] : no abdominal mass palpated [Gait - Sufficient For Exercise Testing] : the gait was sufficient for exercise testing [Abnormal Walk] : normal gait [Nail Clubbing] : no clubbing of the fingernails [Cyanosis, Localized] : no localized cyanosis [Petechial Hemorrhages (___cm)] : no petechial hemorrhages [] : no ischemic changes [Oriented To Time, Place, And Person] : oriented to person, place, and time [Affect] : the affect was normal [Mood] : the mood was normal [No Anxiety] : not feeling anxious

## 2020-02-06 NOTE — REVIEW OF SYSTEMS
[Joint Pain] : joint pain [see HPI] : see HPI [Loss Of Hearing] : hearing loss [Dizziness] : dizziness [Tingling (Paresthesia)] : tingling [Numbness (Hypesthesia)] : numbness [Negative] : Heme/Lymph

## 2020-02-29 ENCOUNTER — EMERGENCY (EMERGENCY)
Facility: HOSPITAL | Age: 60
LOS: 1 days | Discharge: HOME | End: 2020-02-29
Attending: EMERGENCY MEDICINE | Admitting: EMERGENCY MEDICINE
Payer: MEDICAID

## 2020-02-29 VITALS
TEMPERATURE: 98 F | RESPIRATION RATE: 18 BRPM | SYSTOLIC BLOOD PRESSURE: 104 MMHG | HEART RATE: 88 BPM | OXYGEN SATURATION: 99 % | DIASTOLIC BLOOD PRESSURE: 65 MMHG

## 2020-02-29 VITALS
HEART RATE: 95 BPM | WEIGHT: 199.96 LBS | RESPIRATION RATE: 19 BRPM | TEMPERATURE: 98 F | HEIGHT: 63 IN | SYSTOLIC BLOOD PRESSURE: 103 MMHG | OXYGEN SATURATION: 96 % | DIASTOLIC BLOOD PRESSURE: 74 MMHG

## 2020-02-29 DIAGNOSIS — G50.0 TRIGEMINAL NEURALGIA: ICD-10-CM

## 2020-02-29 DIAGNOSIS — Z79.82 LONG TERM (CURRENT) USE OF ASPIRIN: ICD-10-CM

## 2020-02-29 DIAGNOSIS — G43.909 MIGRAINE, UNSPECIFIED, NOT INTRACTABLE, WITHOUT STATUS MIGRAINOSUS: ICD-10-CM

## 2020-02-29 DIAGNOSIS — Z79.1 LONG TERM (CURRENT) USE OF NON-STEROIDAL ANTI-INFLAMMATORIES (NSAID): ICD-10-CM

## 2020-02-29 DIAGNOSIS — S39.91XD: Chronic | ICD-10-CM

## 2020-02-29 DIAGNOSIS — Z79.899 OTHER LONG TERM (CURRENT) DRUG THERAPY: ICD-10-CM

## 2020-02-29 DIAGNOSIS — J44.9 CHRONIC OBSTRUCTIVE PULMONARY DISEASE, UNSPECIFIED: ICD-10-CM

## 2020-02-29 DIAGNOSIS — N39.0 URINARY TRACT INFECTION, SITE NOT SPECIFIED: ICD-10-CM

## 2020-02-29 LAB
ALBUMIN SERPL ELPH-MCNC: 4.1 G/DL — SIGNIFICANT CHANGE UP (ref 3.3–5)
ALP SERPL-CCNC: 97 U/L — SIGNIFICANT CHANGE UP (ref 40–120)
ALT FLD-CCNC: 20 U/L — SIGNIFICANT CHANGE UP (ref 10–45)
ANION GAP SERPL CALC-SCNC: 15 MMOL/L — SIGNIFICANT CHANGE UP (ref 5–17)
APPEARANCE UR: CLEAR — SIGNIFICANT CHANGE UP
AST SERPL-CCNC: 20 U/L — SIGNIFICANT CHANGE UP (ref 10–40)
BACTERIA # UR AUTO: ABNORMAL /HPF
BASOPHILS # BLD AUTO: 0.05 K/UL — SIGNIFICANT CHANGE UP (ref 0–0.2)
BASOPHILS NFR BLD AUTO: 0.3 % — SIGNIFICANT CHANGE UP (ref 0–2)
BILIRUB SERPL-MCNC: 1.2 MG/DL — SIGNIFICANT CHANGE UP (ref 0.2–1.2)
BILIRUB UR-MCNC: NEGATIVE — SIGNIFICANT CHANGE UP
BUN SERPL-MCNC: 11 MG/DL — SIGNIFICANT CHANGE UP (ref 7–23)
CALCIUM SERPL-MCNC: 9 MG/DL — SIGNIFICANT CHANGE UP (ref 8.4–10.5)
CHLORIDE SERPL-SCNC: 106 MMOL/L — SIGNIFICANT CHANGE UP (ref 96–108)
CO2 SERPL-SCNC: 21 MMOL/L — LOW (ref 22–31)
COLOR SPEC: YELLOW — SIGNIFICANT CHANGE UP
COMMENT - URINE: SIGNIFICANT CHANGE UP
COMMENT - URINE: SIGNIFICANT CHANGE UP
CREAT SERPL-MCNC: 0.89 MG/DL — SIGNIFICANT CHANGE UP (ref 0.5–1.3)
DIFF PNL FLD: ABNORMAL
EOSINOPHIL # BLD AUTO: 0 K/UL — SIGNIFICANT CHANGE UP (ref 0–0.5)
EOSINOPHIL NFR BLD AUTO: 0 % — SIGNIFICANT CHANGE UP (ref 0–6)
EPI CELLS # UR: ABNORMAL /HPF (ref 0–5)
FLU A RESULT: SIGNIFICANT CHANGE UP
FLU A RESULT: SIGNIFICANT CHANGE UP
FLUAV AG NPH QL: SIGNIFICANT CHANGE UP
FLUBV AG NPH QL: SIGNIFICANT CHANGE UP
GLUCOSE SERPL-MCNC: 117 MG/DL — HIGH (ref 70–99)
GLUCOSE UR QL: NEGATIVE — SIGNIFICANT CHANGE UP
HCT VFR BLD CALC: 36.9 % — SIGNIFICANT CHANGE UP (ref 34.5–45)
HGB BLD-MCNC: 12.8 G/DL — SIGNIFICANT CHANGE UP (ref 11.5–15.5)
IMM GRANULOCYTES NFR BLD AUTO: 0.6 % — SIGNIFICANT CHANGE UP (ref 0–1.5)
KETONES UR-MCNC: NEGATIVE — SIGNIFICANT CHANGE UP
LEUKOCYTE ESTERASE UR-ACNC: ABNORMAL
LYMPHOCYTES # BLD AUTO: 12.9 % — LOW (ref 13–44)
LYMPHOCYTES # BLD AUTO: 2.26 K/UL — SIGNIFICANT CHANGE UP (ref 1–3.3)
MAGNESIUM SERPL-MCNC: 2.2 MG/DL — SIGNIFICANT CHANGE UP (ref 1.6–2.6)
MCHC RBC-ENTMCNC: 30.9 PG — SIGNIFICANT CHANGE UP (ref 27–34)
MCHC RBC-ENTMCNC: 34.7 GM/DL — SIGNIFICANT CHANGE UP (ref 32–36)
MCV RBC AUTO: 89.1 FL — SIGNIFICANT CHANGE UP (ref 80–100)
MONOCYTES # BLD AUTO: 1.11 K/UL — HIGH (ref 0–0.9)
MONOCYTES NFR BLD AUTO: 6.3 % — SIGNIFICANT CHANGE UP (ref 2–14)
NEUTROPHILS # BLD AUTO: 14.01 K/UL — HIGH (ref 1.8–7.4)
NEUTROPHILS NFR BLD AUTO: 79.9 % — HIGH (ref 43–77)
NITRITE UR-MCNC: POSITIVE
NRBC # BLD: 0 /100 WBCS — SIGNIFICANT CHANGE UP (ref 0–0)
PH UR: 6.5 — SIGNIFICANT CHANGE UP (ref 5–8)
PLATELET # BLD AUTO: 143 K/UL — LOW (ref 150–400)
POTASSIUM SERPL-MCNC: 3.7 MMOL/L — SIGNIFICANT CHANGE UP (ref 3.5–5.3)
POTASSIUM SERPL-SCNC: 3.7 MMOL/L — SIGNIFICANT CHANGE UP (ref 3.5–5.3)
PROT SERPL-MCNC: 7.2 G/DL — SIGNIFICANT CHANGE UP (ref 6–8.3)
PROT UR-MCNC: 30 MG/DL
RBC # BLD: 4.14 M/UL — SIGNIFICANT CHANGE UP (ref 3.8–5.2)
RBC # FLD: 13.2 % — SIGNIFICANT CHANGE UP (ref 10.3–14.5)
RBC CASTS # UR COMP ASSIST: < 5 /HPF — SIGNIFICANT CHANGE UP
RSV RESULT: SIGNIFICANT CHANGE UP
RSV RNA RESP QL NAA+PROBE: SIGNIFICANT CHANGE UP
SODIUM SERPL-SCNC: 142 MMOL/L — SIGNIFICANT CHANGE UP (ref 135–145)
SP GR SPEC: 1.01 — SIGNIFICANT CHANGE UP (ref 1–1.03)
UROBILINOGEN FLD QL: 4 E.U./DL
WBC # BLD: 17.54 K/UL — HIGH (ref 3.8–10.5)
WBC # FLD AUTO: 17.54 K/UL — HIGH (ref 3.8–10.5)
WBC UR QL: ABNORMAL /HPF

## 2020-02-29 PROCEDURE — 85025 COMPLETE CBC W/AUTO DIFF WBC: CPT

## 2020-02-29 PROCEDURE — 83735 ASSAY OF MAGNESIUM: CPT

## 2020-02-29 PROCEDURE — 96375 TX/PRO/DX INJ NEW DRUG ADDON: CPT

## 2020-02-29 PROCEDURE — 87631 RESP VIRUS 3-5 TARGETS: CPT

## 2020-02-29 PROCEDURE — 80053 COMPREHEN METABOLIC PANEL: CPT

## 2020-02-29 PROCEDURE — 71046 X-RAY EXAM CHEST 2 VIEWS: CPT

## 2020-02-29 PROCEDURE — 81001 URINALYSIS AUTO W/SCOPE: CPT

## 2020-02-29 PROCEDURE — 36415 COLL VENOUS BLD VENIPUNCTURE: CPT

## 2020-02-29 PROCEDURE — 87086 URINE CULTURE/COLONY COUNT: CPT

## 2020-02-29 PROCEDURE — 99284 EMERGENCY DEPT VISIT MOD MDM: CPT | Mod: 25

## 2020-02-29 PROCEDURE — 96374 THER/PROPH/DIAG INJ IV PUSH: CPT

## 2020-02-29 PROCEDURE — 99284 EMERGENCY DEPT VISIT MOD MDM: CPT

## 2020-02-29 PROCEDURE — 87186 SC STD MICRODIL/AGAR DIL: CPT

## 2020-02-29 PROCEDURE — 71046 X-RAY EXAM CHEST 2 VIEWS: CPT | Mod: 26

## 2020-02-29 RX ORDER — CARBAMAZEPINE 200 MG
1 TABLET ORAL
Qty: 0 | Refills: 0 | DISCHARGE

## 2020-02-29 RX ORDER — ONDANSETRON 8 MG/1
4 TABLET, FILM COATED ORAL ONCE
Refills: 0 | Status: COMPLETED | OUTPATIENT
Start: 2020-02-29 | End: 2020-02-29

## 2020-02-29 RX ORDER — SODIUM CHLORIDE 9 MG/ML
1000 INJECTION INTRAMUSCULAR; INTRAVENOUS; SUBCUTANEOUS ONCE
Refills: 0 | Status: COMPLETED | OUTPATIENT
Start: 2020-02-29 | End: 2020-02-29

## 2020-02-29 RX ORDER — ATORVASTATIN CALCIUM 80 MG/1
1 TABLET, FILM COATED ORAL
Qty: 0 | Refills: 0 | DISCHARGE

## 2020-02-29 RX ORDER — ACETAMINOPHEN WITH CODEINE 300MG-30MG
0 TABLET ORAL
Qty: 0 | Refills: 0 | DISCHARGE

## 2020-02-29 RX ORDER — CEFPODOXIME PROXETIL 100 MG
1 TABLET ORAL
Qty: 14 | Refills: 0
Start: 2020-02-29 | End: 2020-03-06

## 2020-02-29 RX ORDER — ACETAMINOPHEN WITH CODEINE 300MG-30MG
1 TABLET ORAL ONCE
Refills: 0 | Status: DISCONTINUED | OUTPATIENT
Start: 2020-02-29 | End: 2020-02-29

## 2020-02-29 RX ORDER — BACLOFEN 100 %
1 POWDER (GRAM) MISCELLANEOUS
Qty: 0 | Refills: 0 | DISCHARGE

## 2020-02-29 RX ORDER — ASPIRIN/CALCIUM CARB/MAGNESIUM 324 MG
1 TABLET ORAL
Qty: 0 | Refills: 0 | DISCHARGE

## 2020-02-29 RX ORDER — KETOROLAC TROMETHAMINE 30 MG/ML
15 SYRINGE (ML) INJECTION ONCE
Refills: 0 | Status: DISCONTINUED | OUTPATIENT
Start: 2020-02-29 | End: 2020-02-29

## 2020-02-29 RX ORDER — CEFTRIAXONE 500 MG/1
1000 INJECTION, POWDER, FOR SOLUTION INTRAMUSCULAR; INTRAVENOUS ONCE
Refills: 0 | Status: COMPLETED | OUTPATIENT
Start: 2020-02-29 | End: 2020-02-29

## 2020-02-29 RX ADMIN — Medication 15 MILLIGRAM(S): at 10:08

## 2020-02-29 RX ADMIN — Medication 15 MILLIGRAM(S): at 09:35

## 2020-02-29 RX ADMIN — Medication 1 TABLET(S): at 13:10

## 2020-02-29 RX ADMIN — SODIUM CHLORIDE 1000 MILLILITER(S): 9 INJECTION INTRAMUSCULAR; INTRAVENOUS; SUBCUTANEOUS at 09:35

## 2020-02-29 RX ADMIN — ONDANSETRON 4 MILLIGRAM(S): 8 TABLET, FILM COATED ORAL at 09:35

## 2020-02-29 RX ADMIN — CEFTRIAXONE 100 MILLIGRAM(S): 500 INJECTION, POWDER, FOR SOLUTION INTRAMUSCULAR; INTRAVENOUS at 11:49

## 2020-02-29 NOTE — ED PROVIDER NOTE - CLINICAL SUMMARY MEDICAL DECISION MAKING FREE TEXT BOX
Impression: Pt with hx of L trigeminal neuralgia s/p CVA with chronic L facial pain with acute worsening of symptoms in addition to nausea, vomiting, diarrhea, and abdominal cramping x few days. Pt is afebrile and hemodynamically stable in ED. Abdomen exam are benign and non nonfocal neuro deficits. Labs reviewed with leukocytosis 17, remainder of labs are wnl. Flu swab negative. Impression: Pt with hx of L trigeminal neuralgia s/p CVA with chronic L facial pain with acute worsening of symptoms in addition to nausea, vomiting, diarrhea, and abdominal cramping x few days. Pt is afebrile and hemodynamically stable in ED. Abdomen exam are benign and non nonfocal neuro deficits. Labs reviewed with leukocytosis 17, remainder of labs are wnl. Flu swab negative. CXR negative for infiltrate. UA positive for infection. Pt given Rocephin for UTI. Hydration with fluids and given Toradol for pain with improvement of symptoms. Pt has an appointment with her pain specialist in 2 day which the pt was advise to f/u with. Impression: Pt with hx of L trigeminal neuralgia s/p CVA with chronic L facial pain with acute worsening of symptoms in addition to nausea, vomiting, diarrhea, and abdominal cramping x few days. Pt is afebrile and hemodynamically stable in ED. Abdominal exam is benign; no focal neuro deficits. Labs reviewed with leukocytosis to 17, remainder of labs are wnl. Flu swab negative. CXR negative for infiltrate. UA positive for infection. Pt given Rocephin for UTI. Pt hydrated with fluids and given Toradol for pain with improvement of symptoms. Pt has an appointment with her pain specialist in 2 days. ED evaluation and management discussed with the patient and family in detail.  Close PMD/ pain management follow up encouraged.  Strict ED return instructions discussed in detail and patient given the opportunity to ask any questions about their discharge diagnosis and instructions. Patient verbalized understanding.

## 2020-02-29 NOTE — ED ADULT NURSE NOTE - CHPI ED NUR SYMPTOMS NEG
no weakness/no confusion/no dizziness/no loss of consciousness/no change in level of consciousness/no fever/no nausea/no numbness/no blurred vision/no vomiting

## 2020-02-29 NOTE — ED PROVIDER NOTE - PHYSICAL EXAMINATION
VITAL SIGNS: I have reviewed nursing notes and confirm.  CONSTITUTIONAL: Obese female; in no acute distress.   SKIN:  warm and dry, no acute rash.   HEAD:  normocephalic, atraumatic.  EYES: PERRL, EOM intact; conjunctiva and sclera clear.  ENT: Dry mucous membrane.   NECK: Supple; non tender.  CARD: S1, S2 normal; no murmurs, gallops, or rubs. Regular rate and rhythm.   RESP:  Clear to auscultation b/l, no wheezes, rales or rhonchi.  ABD: Normal bowel sounds; soft; non-distended; non-tender; no guarding/ rebound.  EXT: Normal ROM. No clubbing, cyanosis or edema. 2+ pulses to b/l ue/le.  NEURO: Alert, oriented, grossly unremarkable  PSYCH: Cooperative, mood and affect appropriate. VITAL SIGNS: I have reviewed nursing notes and confirm.  CONSTITUTIONAL: Obese female; in no acute distress.   SKIN:  warm and dry, no acute rash.   HEAD:  normocephalic, atraumatic.  EYES: PERRL, EOM intact; conjunctiva and sclera clear.  ENT: Dry mucous membrane.   NECK: Supple; non tender.  CARD: S1, S2 normal; no murmurs, gallops, or rubs. Regular rate and rhythm.   RESP:  Clear to auscultation b/l, no wheezes, rales or rhonchi.  ABD: Normal bowel sounds; soft; non-distended; non-tender; no guarding/ rebound.  EXT: Normal ROM. No clubbing, cyanosis or edema. 2+ pulses to b/l ue/le.  NEURO: Alert, oriented x 3, CN II-XII grossly intact. Motor/ sensation intact and equal b/l. Neg pronator drift.   PSYCH: Cooperative, mood and affect appropriate.

## 2020-02-29 NOTE — ED ADULT NURSE NOTE - NSIMPLEMENTINTERV_GEN_ALL_ED
Implemented All Universal Safety Interventions:  [place] to call system. Call bell, personal items and telephone within reach. Instruct patient to call for assistance. Room bathroom lighting operational. Non-slip footwear when patient is off stretcher. Physically safe environment: no spills, clutter or unnecessary equipment. Stretcher in lowest position, wheels locked, appropriate side rails in place.

## 2020-02-29 NOTE — ED PROVIDER NOTE - PATIENT PORTAL LINK FT
You can access the FollowMyHealth Patient Portal offered by [facility] by registering at the following website: [email or web address]. By joining [facility]’s FollowMyHealth portal, you will also be able to view your health information using other applications (apps) compatible with our system.

## 2020-02-29 NOTE — ED PROVIDER NOTE - OBJECTIVE STATEMENT
61 y/o F with PMHx CVA in 2017 with residual L sided facial and head pain, described as burning, trigeminal neuralgia to L side (on gabapentin with no significant improvement of symptoms), presents to ED with nausea, vomiting, diarrhea, and abdominal cramping starting 3 days ago with association of myalgia and generalized weakness. Pt notes L side of head feels worse. Denies fever, URI symptoms, dysuria, hematuria, flank pain, vision changes, slurred speech, SOB, or CP. 59 y/o F with PMHx CVA in 2017 with residual L sided facial and head pain, described as constant burning, dx'd w/ trigeminal neuralgia to L side (on gabapentin with no significant improvement of symptoms), presents to ED with nausea, vomiting, diarrhea, and abdominal cramping starting 3 days ago with association of myalgia and generalized weakness. Pt notes L side of head feels worse. Denies fever, URI symptoms, dysuria, hematuria, flank pain, vision changes, slurred speech, SOB, or CP.

## 2020-02-29 NOTE — ED PROVIDER NOTE - CHPI ED SYMPTOMS NEG
no URI symptoms, no dysuria, no hematuria, no flank pain, no vision changes, no slurred speech, no SOB, no CP/no fever

## 2020-02-29 NOTE — ED PROVIDER NOTE - NSFOLLOWUPINSTRUCTIONS_ED_ALL_ED_FT
Drink plenty of fluids. Take cefpodoxime twice daily for the following week. Follow up with your primary care physician and pain management specialist as scheduled next week. Return to er for any new or worsening symptoms.     Urinary Tract Infection    A urinary tract infection (UTI) is an infection of any part of the urinary tract, which includes the kidneys, ureters, bladder, and urethra. Risk factors include ignoring your need to urinate, wiping back to front if female, being an uncircumcised male, and having diabetes or a weak immune system. Symptoms include frequent urination, pain or burning with urination, foul smelling urine, cloudy urine, pain in the lower abdomen, blood in the urine, and fever. If you were prescribed an antibiotic medicine, take it as told by your health care provider. Do not stop taking the antibiotic even if you start to feel better.    SEEK IMMEDIATE MEDICAL CARE IF YOU HAVE ANY OF THE FOLLOWING SYMPTOMS: severe back or abdominal pain, fever, inability to keep fluids or medicine down, dizziness/lightheadedness, or a change in mental status.

## 2020-02-29 NOTE — ED PROVIDER NOTE - CARE PLAN
Principal Discharge DX:	Urinary tract infection without hematuria, site unspecified  Secondary Diagnosis:	Trigeminal neuralgia of left side of face

## 2020-02-29 NOTE — ED ADULT NURSE NOTE - OBJECTIVE STATEMENT
PT is a 59 y/o female came in for evaluation of migraine headache for 4 days. Pt reports that she went to her PMD requesting tylenol 3 and was told that she needed to go to her pain management Doctor. PT also c/o hip pain.

## 2020-02-29 NOTE — ED ADULT NURSE REASSESSMENT NOTE - NS ED NURSE REASSESS COMMENT FT1
Pt and Pt's on started screaming at nursing staff and at other patients. Security called, code gray called.

## 2020-03-02 LAB
-  AMPICILLIN/SULBACTAM: SIGNIFICANT CHANGE UP
-  AMPICILLIN: SIGNIFICANT CHANGE UP
-  CEFAZOLIN: SIGNIFICANT CHANGE UP
-  CEFTRIAXONE: SIGNIFICANT CHANGE UP
-  GENTAMICIN: SIGNIFICANT CHANGE UP
-  NITROFURANTOIN: SIGNIFICANT CHANGE UP
-  PIPERACILLIN/TAZOBACTAM: SIGNIFICANT CHANGE UP
-  TOBRAMYCIN: SIGNIFICANT CHANGE UP
-  TRIMETHOPRIM/SULFAMETHOXAZOLE: SIGNIFICANT CHANGE UP
CULTURE RESULTS: SIGNIFICANT CHANGE UP
METHOD TYPE: SIGNIFICANT CHANGE UP
ORGANISM # SPEC MICROSCOPIC CNT: SIGNIFICANT CHANGE UP
ORGANISM # SPEC MICROSCOPIC CNT: SIGNIFICANT CHANGE UP
SPECIMEN SOURCE: SIGNIFICANT CHANGE UP

## 2020-03-16 ENCOUNTER — APPOINTMENT (OUTPATIENT)
Dept: INTERNAL MEDICINE | Facility: CLINIC | Age: 60
End: 2020-03-16

## 2020-04-13 ENCOUNTER — RX RENEWAL (OUTPATIENT)
Age: 60
End: 2020-04-13

## 2020-04-13 RX ORDER — LISINOPRIL 5 MG/1
5 TABLET ORAL
Qty: 30 | Refills: 4 | Status: ACTIVE | COMMUNITY
Start: 2020-04-13 | End: 1900-01-01

## 2020-04-30 ENCOUNTER — APPOINTMENT (OUTPATIENT)
Dept: INTERNAL MEDICINE | Facility: CLINIC | Age: 60
End: 2020-04-30
Payer: MEDICAID

## 2020-04-30 PROCEDURE — 99213 OFFICE O/P EST LOW 20 MIN: CPT | Mod: 95

## 2020-04-30 RX ORDER — ALBUTEROL SULFATE 2.5 MG/3ML
(2.5 MG/3ML) SOLUTION RESPIRATORY (INHALATION)
Qty: 1 | Refills: 11 | Status: ACTIVE | COMMUNITY
Start: 2020-04-30 | End: 1900-01-01

## 2020-04-30 RX ORDER — MOMETASONE FUROATE AND FORMOTEROL FUMARATE DIHYDRATE 200; 5 UG/1; UG/1
200-5 AEROSOL RESPIRATORY (INHALATION)
Qty: 1 | Refills: 3 | Status: ACTIVE | COMMUNITY
Start: 2020-04-30 | End: 1900-01-01

## 2020-04-30 RX ORDER — SOFT LENS DISINFECTANT
SOLUTION, NON-ORAL MISCELLANEOUS
Qty: 1 | Refills: 0 | Status: ACTIVE | COMMUNITY
Start: 2020-04-30 | End: 1900-01-01

## 2020-04-30 NOTE — PLAN
[FreeTextEntry1] : \par \par [facility] from [facility]\par  Send Rx for Nebuliazer and albuterl solution \par \par Encourage follow up in the office

## 2020-04-30 NOTE — HISTORY OF PRESENT ILLNESS
[Home] : at home, [unfilled] , at the time of the visit. [Patient] : the patient [Other Location: e.g. Home (Enter Location, City,State)___] : at [unfilled] [de-identified] : Spoke with patient.  SHe reports her COPD has been worse.  Having dyspnea.  [facility].\par Currently using spiriva, Symbicort, and albuterol.\par \par \par

## 2020-11-10 ENCOUNTER — APPOINTMENT (OUTPATIENT)
Dept: INTERNAL MEDICINE | Facility: CLINIC | Age: 60
End: 2020-11-10
Payer: MEDICAID

## 2020-11-10 VITALS
OXYGEN SATURATION: 97 % | DIASTOLIC BLOOD PRESSURE: 80 MMHG | HEART RATE: 80 BPM | SYSTOLIC BLOOD PRESSURE: 118 MMHG | TEMPERATURE: 98.4 F

## 2020-11-10 DIAGNOSIS — I63.9 CEREBRAL INFARCTION, UNSPECIFIED: ICD-10-CM

## 2020-11-10 DIAGNOSIS — E78.5 HYPERLIPIDEMIA, UNSPECIFIED: ICD-10-CM

## 2020-11-10 PROCEDURE — 99072 ADDL SUPL MATRL&STAF TM PHE: CPT

## 2020-11-10 PROCEDURE — 99214 OFFICE O/P EST MOD 30 MIN: CPT

## 2020-11-10 PROCEDURE — 36415 COLL VENOUS BLD VENIPUNCTURE: CPT

## 2020-11-10 RX ORDER — LIDOCAINE 4% 4 G/100G
4 CREAM TOPICAL
Qty: 1 | Refills: 4 | Status: ACTIVE | COMMUNITY
Start: 2020-11-10 | End: 1900-01-01

## 2020-11-10 NOTE — HISTORY OF PRESENT ILLNESS
[FreeTextEntry1] : facial pain [de-identified] : 59 yo f with COPD, HTN, HLD, CVA\par -reports with walking 1-2 blocks -gets sig abd pain and has to go home.  Gets a feeling of lightheadedness.  \par Seeing pain management - Dr Rogers - advised to get a procedure for her back  had it done and it did not help.  and then had it again- and it has gotten worse.  Legs get weak.  \par Started a new medication and that’s when the abd pain started.  \par Continues to have facial burning.  - taking gabapentin\par pain in legs and feet.\par THe pain management doc does not prescribe oral medication.  \par Went to urgent care recently.  given abx.   \par \par

## 2020-11-10 NOTE — PLAN
[FreeTextEntry1] : \par \par Pain management - tyle #3 to be taken PRN only.  knows not to take it daily.  \par \par Plan to follow up with Dr Mead.\par \par Labs today: - check alk phos, ggt\par \par HTN- controlled with ACEi\par \par COPD-stable with dulera.  \par

## 2020-11-10 NOTE — PHYSICAL EXAM
[Normal] : normal rate, regular rhythm, normal S1 and S2 and no murmur heard [No Edema] : there was no peripheral edema [Soft] : abdomen soft [Normal Insight/Judgement] : insight and judgment were intact [de-identified] : mild diffuse td [de-identified] : diffuse low back tenderness [de-identified] : teary in the office

## 2020-11-10 NOTE — REVIEW OF SYSTEMS
[Abdominal Pain] : abdominal pain [Nausea] : nausea [Joint Pain] : joint pain [Back Pain] : back pain [Negative] : Cardiovascular [FreeTextEntry6] : as above

## 2020-11-11 LAB
ALBUMIN SERPL ELPH-MCNC: 4.5 G/DL
ALP BLD-CCNC: 97 U/L
ALT SERPL-CCNC: 96 U/L
ANION GAP SERPL CALC-SCNC: 13 MMOL/L
AST SERPL-CCNC: 65 U/L
BASOPHILS # BLD AUTO: 0.07 K/UL
BASOPHILS NFR BLD AUTO: 1.1 %
BILIRUB SERPL-MCNC: 0.4 MG/DL
BUN SERPL-MCNC: 13 MG/DL
CALCIUM SERPL-MCNC: 10.1 MG/DL
CHLORIDE SERPL-SCNC: 107 MMOL/L
CO2 SERPL-SCNC: 22 MMOL/L
CREAT SERPL-MCNC: 0.9 MG/DL
EOSINOPHIL # BLD AUTO: 0.38 K/UL
EOSINOPHIL NFR BLD AUTO: 6 %
GGT SERPL-CCNC: 222 U/L
GLUCOSE SERPL-MCNC: 109 MG/DL
HCT VFR BLD CALC: 40.6 %
HGB BLD-MCNC: 13.2 G/DL
IMM GRANULOCYTES NFR BLD AUTO: 0.2 %
LYMPHOCYTES # BLD AUTO: 2.8 K/UL
LYMPHOCYTES NFR BLD AUTO: 44 %
MAN DIFF?: NORMAL
MCHC RBC-ENTMCNC: 29.8 PG
MCHC RBC-ENTMCNC: 32.5 GM/DL
MCV RBC AUTO: 91.6 FL
MONOCYTES # BLD AUTO: 0.61 K/UL
MONOCYTES NFR BLD AUTO: 9.6 %
NEUTROPHILS # BLD AUTO: 2.49 K/UL
NEUTROPHILS NFR BLD AUTO: 39.1 %
PLATELET # BLD AUTO: 208 K/UL
POTASSIUM SERPL-SCNC: 4.1 MMOL/L
PROT SERPL-MCNC: 6.9 G/DL
RBC # BLD: 4.43 M/UL
RBC # FLD: 14.9 %
SODIUM SERPL-SCNC: 142 MMOL/L
WBC # FLD AUTO: 6.36 K/UL

## 2020-12-09 NOTE — ED ADULT NURSE NOTE - CHPI ED NUR TIMING2
gradual onset Transposition Flap Text: The defect edges were debeveled with a #15 scalpel blade.  Given the location of the defect and the proximity to free margins a transposition flap was deemed most appropriate.  Using a sterile surgical marker, an appropriate transposition flap was drawn incorporating the defect.    The area thus outlined was incised deep to adipose tissue with a #15 scalpel blade.  The skin margins were undermined to an appropriate distance in all directions utilizing iris scissors.

## 2021-01-14 RX ORDER — ACETAMINOPHEN AND CODEINE 300; 30 MG/1; MG/1
300-30 TABLET ORAL
Qty: 21 | Refills: 0 | Status: ACTIVE | COMMUNITY
Start: 2020-11-10 | End: 1900-01-01

## 2021-04-22 ENCOUNTER — APPOINTMENT (OUTPATIENT)
Dept: INTERNAL MEDICINE | Facility: CLINIC | Age: 61
End: 2021-04-22
Payer: MEDICAID

## 2021-04-22 VITALS
TEMPERATURE: 98.4 F | HEIGHT: 62 IN | SYSTOLIC BLOOD PRESSURE: 120 MMHG | HEART RATE: 82 BPM | OXYGEN SATURATION: 97 % | DIASTOLIC BLOOD PRESSURE: 84 MMHG

## 2021-04-22 DIAGNOSIS — R51.9 HEADACHE, UNSPECIFIED: ICD-10-CM

## 2021-04-22 DIAGNOSIS — K76.0 FATTY (CHANGE OF) LIVER, NOT ELSEWHERE CLASSIFIED: ICD-10-CM

## 2021-04-22 DIAGNOSIS — I10 ESSENTIAL (PRIMARY) HYPERTENSION: ICD-10-CM

## 2021-04-22 DIAGNOSIS — J43.9 EMPHYSEMA, UNSPECIFIED: ICD-10-CM

## 2021-04-22 PROCEDURE — 99214 OFFICE O/P EST MOD 30 MIN: CPT

## 2021-04-22 PROCEDURE — 99072 ADDL SUPL MATRL&STAF TM PHE: CPT

## 2021-04-22 PROCEDURE — 36415 COLL VENOUS BLD VENIPUNCTURE: CPT

## 2021-04-22 RX ORDER — AMOXICILLIN 500 MG/1
500 CAPSULE ORAL TWICE DAILY
Qty: 56 | Refills: 0 | Status: DISCONTINUED | COMMUNITY
Start: 2019-07-18 | End: 2021-04-22

## 2021-04-22 RX ORDER — LEVOFLOXACIN 500 MG/1
500 TABLET, FILM COATED ORAL DAILY
Qty: 14 | Refills: 0 | Status: DISCONTINUED | COMMUNITY
Start: 2019-07-18 | End: 2021-04-22

## 2021-04-22 NOTE — PLAN
[FreeTextEntry1] : \par COPD_ continue dulera - \par \par Back and facal pain - rx for lyrica.  ADVISE AGAINST TAKING IT WITH THE GABAPENTIN\par \par Cholelithiasis  \par Steatosis with elev lfts - \par Encourage fup with Dr Mead and getting the colonoscopy.  \par \par HLD- continue statin

## 2021-04-22 NOTE — HISTORY OF PRESENT ILLNESS
[FreeTextEntry1] : facial pain [de-identified] : 62 yo f with COPD, HTN, HLD, CVA, elev LFTs\par \par Having pain down her right leg. Was using icy hot and lidocaine.  Pain was severe.  had trouble walking.  Went to [facility].\par GIven a needle inj given in her left buttocks.  THis took the pain away from her leg her back and her facial pain improved as well.  Given lyrica for nighttime.\par \par she has been rubbing lidocine onto her face- has become very irritated.\par \par

## 2021-04-22 NOTE — REVIEW OF SYSTEMS
[Fatigue] : fatigue [Joint Pain] : joint pain [Back Pain] : back pain [Negative] : Psychiatric [de-identified] : facial erythema / redness

## 2021-04-22 NOTE — PHYSICAL EXAM
[No Edema] : there was no peripheral edema [Normal] : affect was normal and insight and judgment were intact [de-identified] : erythema left cheek

## 2021-04-23 LAB
ALBUMIN SERPL ELPH-MCNC: 4.4 G/DL
ALP BLD-CCNC: 85 U/L
ALT SERPL-CCNC: 30 U/L
ANION GAP SERPL CALC-SCNC: 15 MMOL/L
AST SERPL-CCNC: 25 U/L
BASOPHILS # BLD AUTO: 0.08 K/UL
BASOPHILS NFR BLD AUTO: 1.1 %
BILIRUB SERPL-MCNC: 0.3 MG/DL
BUN SERPL-MCNC: 14 MG/DL
CALCIUM SERPL-MCNC: 9.7 MG/DL
CHLORIDE SERPL-SCNC: 108 MMOL/L
CHOLEST SERPL-MCNC: 175 MG/DL
CO2 SERPL-SCNC: 21 MMOL/L
CREAT SERPL-MCNC: 0.84 MG/DL
EOSINOPHIL # BLD AUTO: 0.38 K/UL
EOSINOPHIL NFR BLD AUTO: 5.3 %
ESTIMATED AVERAGE GLUCOSE: 117 MG/DL
GGT SERPL-CCNC: 76 U/L
GLUCOSE SERPL-MCNC: 130 MG/DL
HBA1C MFR BLD HPLC: 5.7 %
HCT VFR BLD CALC: 40.6 %
HDLC SERPL-MCNC: 50 MG/DL
HGB BLD-MCNC: 13.6 G/DL
IMM GRANULOCYTES NFR BLD AUTO: 0.4 %
LDLC SERPL CALC-MCNC: 92 MG/DL
LYMPHOCYTES # BLD AUTO: 2.88 K/UL
LYMPHOCYTES NFR BLD AUTO: 39.9 %
MAN DIFF?: NORMAL
MCHC RBC-ENTMCNC: 30.5 PG
MCHC RBC-ENTMCNC: 33.5 GM/DL
MCV RBC AUTO: 91 FL
MONOCYTES # BLD AUTO: 0.54 K/UL
MONOCYTES NFR BLD AUTO: 7.5 %
NEUTROPHILS # BLD AUTO: 3.3 K/UL
NEUTROPHILS NFR BLD AUTO: 45.8 %
NONHDLC SERPL-MCNC: 125 MG/DL
PLATELET # BLD AUTO: 223 K/UL
POTASSIUM SERPL-SCNC: 4 MMOL/L
PROT SERPL-MCNC: 7.2 G/DL
RBC # BLD: 4.46 M/UL
RBC # FLD: 13.5 %
SODIUM SERPL-SCNC: 144 MMOL/L
TRIGL SERPL-MCNC: 167 MG/DL
TSH SERPL-ACNC: 2.06 UIU/ML
WBC # FLD AUTO: 7.21 K/UL

## 2021-04-23 RX ORDER — PREGABALIN 100 MG/1
100 CAPSULE ORAL
Qty: 60 | Refills: 4 | Status: ACTIVE | COMMUNITY
Start: 2021-04-23 | End: 1900-01-01

## 2021-06-17 ENCOUNTER — EMERGENCY (EMERGENCY)
Facility: HOSPITAL | Age: 61
LOS: 1 days | Discharge: LEFT BEFORE DISCHARGE | End: 2021-06-17
Attending: EMERGENCY MEDICINE | Admitting: EMERGENCY MEDICINE
Payer: MEDICAID

## 2021-06-17 VITALS
HEART RATE: 79 BPM | SYSTOLIC BLOOD PRESSURE: 111 MMHG | OXYGEN SATURATION: 97 % | TEMPERATURE: 98 F | HEIGHT: 63 IN | DIASTOLIC BLOOD PRESSURE: 59 MMHG | RESPIRATION RATE: 18 BRPM | WEIGHT: 210.1 LBS

## 2021-06-17 DIAGNOSIS — I10 ESSENTIAL (PRIMARY) HYPERTENSION: ICD-10-CM

## 2021-06-17 DIAGNOSIS — Z53.29 PROCEDURE AND TREATMENT NOT CARRIED OUT BECAUSE OF PATIENT'S DECISION FOR OTHER REASONS: ICD-10-CM

## 2021-06-17 DIAGNOSIS — S39.91XD: Chronic | ICD-10-CM

## 2021-06-17 DIAGNOSIS — J43.9 EMPHYSEMA, UNSPECIFIED: ICD-10-CM

## 2021-06-17 DIAGNOSIS — E78.5 HYPERLIPIDEMIA, UNSPECIFIED: ICD-10-CM

## 2021-06-17 DIAGNOSIS — E66.9 OBESITY, UNSPECIFIED: ICD-10-CM

## 2021-06-17 DIAGNOSIS — Z86.73 PERSONAL HISTORY OF TRANSIENT ISCHEMIC ATTACK (TIA), AND CEREBRAL INFARCTION WITHOUT RESIDUAL DEFICITS: ICD-10-CM

## 2021-06-17 DIAGNOSIS — R07.89 OTHER CHEST PAIN: ICD-10-CM

## 2021-06-17 LAB
ALBUMIN SERPL ELPH-MCNC: 4.2 G/DL — SIGNIFICANT CHANGE UP (ref 3.3–5)
ALP SERPL-CCNC: 90 U/L — SIGNIFICANT CHANGE UP (ref 40–120)
ALT FLD-CCNC: 23 U/L — SIGNIFICANT CHANGE UP (ref 10–45)
ANION GAP SERPL CALC-SCNC: 10 MMOL/L — SIGNIFICANT CHANGE UP (ref 5–17)
AST SERPL-CCNC: 23 U/L — SIGNIFICANT CHANGE UP (ref 10–40)
BASOPHILS # BLD AUTO: 0.09 K/UL — SIGNIFICANT CHANGE UP (ref 0–0.2)
BASOPHILS NFR BLD AUTO: 1 % — SIGNIFICANT CHANGE UP (ref 0–2)
BILIRUB SERPL-MCNC: 0.3 MG/DL — SIGNIFICANT CHANGE UP (ref 0.2–1.2)
BUN SERPL-MCNC: 12 MG/DL — SIGNIFICANT CHANGE UP (ref 7–23)
CALCIUM SERPL-MCNC: 9.2 MG/DL — SIGNIFICANT CHANGE UP (ref 8.4–10.5)
CHLORIDE SERPL-SCNC: 106 MMOL/L — SIGNIFICANT CHANGE UP (ref 96–108)
CO2 SERPL-SCNC: 23 MMOL/L — SIGNIFICANT CHANGE UP (ref 22–31)
CREAT SERPL-MCNC: 0.88 MG/DL — SIGNIFICANT CHANGE UP (ref 0.5–1.3)
EOSINOPHIL # BLD AUTO: 0.41 K/UL — SIGNIFICANT CHANGE UP (ref 0–0.5)
EOSINOPHIL NFR BLD AUTO: 4.4 % — SIGNIFICANT CHANGE UP (ref 0–6)
GLUCOSE SERPL-MCNC: 134 MG/DL — HIGH (ref 70–99)
HCT VFR BLD CALC: 39.9 % — SIGNIFICANT CHANGE UP (ref 34.5–45)
HGB BLD-MCNC: 13.8 G/DL — SIGNIFICANT CHANGE UP (ref 11.5–15.5)
IMM GRANULOCYTES NFR BLD AUTO: 0.3 % — SIGNIFICANT CHANGE UP (ref 0–1.5)
LYMPHOCYTES # BLD AUTO: 3.99 K/UL — HIGH (ref 1–3.3)
LYMPHOCYTES # BLD AUTO: 43.1 % — SIGNIFICANT CHANGE UP (ref 13–44)
MCHC RBC-ENTMCNC: 30.5 PG — SIGNIFICANT CHANGE UP (ref 27–34)
MCHC RBC-ENTMCNC: 34.6 GM/DL — SIGNIFICANT CHANGE UP (ref 32–36)
MCV RBC AUTO: 88.1 FL — SIGNIFICANT CHANGE UP (ref 80–100)
MONOCYTES # BLD AUTO: 0.83 K/UL — SIGNIFICANT CHANGE UP (ref 0–0.9)
MONOCYTES NFR BLD AUTO: 9 % — SIGNIFICANT CHANGE UP (ref 2–14)
NEUTROPHILS # BLD AUTO: 3.9 K/UL — SIGNIFICANT CHANGE UP (ref 1.8–7.4)
NEUTROPHILS NFR BLD AUTO: 42.2 % — LOW (ref 43–77)
NRBC # BLD: 0 /100 WBCS — SIGNIFICANT CHANGE UP (ref 0–0)
PLATELET # BLD AUTO: 207 K/UL — SIGNIFICANT CHANGE UP (ref 150–400)
POTASSIUM SERPL-MCNC: 4.9 MMOL/L — SIGNIFICANT CHANGE UP (ref 3.5–5.3)
POTASSIUM SERPL-SCNC: 4.9 MMOL/L — SIGNIFICANT CHANGE UP (ref 3.5–5.3)
PROT SERPL-MCNC: 7.2 G/DL — SIGNIFICANT CHANGE UP (ref 6–8.3)
RBC # BLD: 4.53 M/UL — SIGNIFICANT CHANGE UP (ref 3.8–5.2)
RBC # FLD: 13.4 % — SIGNIFICANT CHANGE UP (ref 10.3–14.5)
SODIUM SERPL-SCNC: 139 MMOL/L — SIGNIFICANT CHANGE UP (ref 135–145)
TROPONIN T SERPL-MCNC: 0.01 NG/ML — SIGNIFICANT CHANGE UP (ref 0–0.01)
WBC # BLD: 9.25 K/UL — SIGNIFICANT CHANGE UP (ref 3.8–10.5)
WBC # FLD AUTO: 9.25 K/UL — SIGNIFICANT CHANGE UP (ref 3.8–10.5)

## 2021-06-17 PROCEDURE — 93005 ELECTROCARDIOGRAM TRACING: CPT

## 2021-06-17 PROCEDURE — 36415 COLL VENOUS BLD VENIPUNCTURE: CPT

## 2021-06-17 PROCEDURE — 84484 ASSAY OF TROPONIN QUANT: CPT

## 2021-06-17 PROCEDURE — 85025 COMPLETE CBC W/AUTO DIFF WBC: CPT

## 2021-06-17 PROCEDURE — 99284 EMERGENCY DEPT VISIT MOD MDM: CPT

## 2021-06-17 PROCEDURE — 80053 COMPREHEN METABOLIC PANEL: CPT

## 2021-06-17 PROCEDURE — 71046 X-RAY EXAM CHEST 2 VIEWS: CPT | Mod: 26

## 2021-06-17 PROCEDURE — 71046 X-RAY EXAM CHEST 2 VIEWS: CPT

## 2021-06-17 PROCEDURE — 99283 EMERGENCY DEPT VISIT LOW MDM: CPT | Mod: 25

## 2021-06-17 NOTE — ED PROVIDER NOTE - NSFOLLOWUPINSTRUCTIONS_ED_ALL_ED_FT
PLEASE FOLLOW-UP WITH YOUR PCP*** IN 1-2 DAYS.    ANY XRAY IMAGING RESULTS GIVEN IN THE EMERGENCY DEPARTMENT ARE PRELIMINARY UNTIL FORMALLY READ BY A RADIOLOGIST. YOU WILL BE CONTACTED IF THERE ARE ANY SIGNIFICANT CHANGES IN THE FINAL READ.    PLEASE RETURN TO THE EMERGENCY DEPARTMENT IF ***, FEVER, CHEST PAIN, SHORTNESS OF BREATH, ABDOMINAL PAIN, VOMITING, OTHER CONCERNING SYMPTOMS.

## 2021-06-17 NOTE — ED ADULT NURSE REASSESSMENT NOTE - NS ED NURSE REASSESS COMMENT FT1
pt left AMA verbalized understanding and risks vs benefits and need to f/u with pmd radha Ferreira papers also

## 2021-06-17 NOTE — ED ADULT TRIAGE NOTE - CHIEF COMPLAINT QUOTE
patient BIBA from home complains of midsternal chest pain non radiating after having an argument. patient took 3 baby aspirin and called ambulance. she reports feeling better at this time. denies sob, cough, fever, chills

## 2021-06-17 NOTE — ED PROVIDER NOTE - OBJECTIVE STATEMENT
61F obesity, htn, hld, cva (2019), L trigeminal neuralgia on gabapentin, copd w/ emphysema not on home o2, hepatic steatosis (elevated LFTs), hpylori infection s/p abx (4/22/21), chronic back pain s/p recent steroid injection (>1w ago), c/o <24h episodic nonradiating nonpositional nonpleuritic nonexertional L diffuse chest tightness lasting 30min-1hr initially started while braiding autistic grandchild's hair who was screaming. 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 last episode around 3p today. s/p total asa 243. contrary to chart, pt denies cad stating no prior cath/ccta and reportedly stress test wnl (>1y ago). no fever/chills, no ha/dizziness, no uri/cough, no sob, no abd pain/n/v, no diarrhea, no hematochezia/melena, no change in appetite/po intake, no dysuria, no pedal edema/pain/rash, no prior covid, no covid19 vaccination, no trauma, no etoh-dpt/ivdu.     pcp: maria isabel garcia

## 2021-06-17 NOTE — ED PROVIDER NOTE - PHYSICAL EXAMINATION
CONST: obese nontoxic NAD speaking in full sentences  HEAD: atraumatic  EYES: conjunctivae clear, PERRL, EOMI  ENT: mmm  NECK: supple/FROM, nttp, no jvd  CARD: rrr no murmurs  CHEST: ctab no r/r/w, no stridor/retractions/tripoding  ABD: soft, nd, nttp, no rebound/guarding  EXT: FROM, symmetric distal pulses intact  SKIN: warm, dry, no rash, no pedal edema/ttp/rash, cap refill <2sec  NEURO: a+ox3, 5/5 strength x4, gross sensation intact x4, baseline gait

## 2021-06-17 NOTE — ED ADULT NURSE NOTE - OBJECTIVE STATEMENT
Pt AOX4. Pt c/o left sided chest discomfort that started this morning at 6am and radiates down the left arm. Pt states "I start to feel the discomfort when I am trying to make a point or argue with someone". Pt denies chest pain upon arrival. Pt denies fevers, chills, n/v, dizziness, weakness, numbness, tingling. Pt speaking in full complete sentences. Respirations even and unlabored.

## 2021-06-17 NOTE — ED PROVIDER NOTE - CLINICAL SUMMARY MEDICAL DECISION MAKING FREE TEXT BOX
avss. nontoxic. NAD. no leukocytosis vs significant anemia vs electrolyte abnl. trop wnl. ekg w/o significant st/t changes. cxr w/o acute focal consol vs ptx vs pulm edema vs widened mediastinum. reccs for repeat labs and/or admission. pt w/ full capacity requesting to leave AMA (see progress note). will dc AMA w/ outpatient pcp fu. strict return precautions. pt agrees w/ plan. questions answered.

## 2021-06-17 NOTE — ED PROVIDER NOTE - CARE PROVIDER_API CALL
Josh Wadsworth  INTERNAL MEDICINE  [address] [place] [address], [place] [address]  Phone: [phone]  Fax: [phone]  Follow Up Time: Urgent

## 2021-06-17 NOTE — ED PROVIDER NOTE - PATIENT PORTAL LINK FT
You can access the FollowMyHealth Patient Portal offered by [facility] by registering at the following website: [email or web address]. By joining [employer]’s FollowMyHealth portal, you will also be able to view your health information using other applications (apps) compatible with our system.

## 2021-06-17 NOTE — ED PROVIDER NOTE - PROGRESS NOTE DETAILS
pt w/ full capacity. 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 she agrees to rediscuss after the first set of lab results. pt w/ full capacity. reccs for repeat trop or admission for r/o acs. pt declining admission. requesting to leave AMA given she has to take care of her grandchildren. pt understands risk of missed/worsening disease, including possible mortality. states she will fu as outpatient or otherwise return to ED. questions answered. strict return precautions. pt w/ full capacity. reccs for repeat trop or admission for r/o acs. pt declining both repeat labs and admission. still requesting to leave AMA.. pt understands risk of missed/worsening disease, including possible mortality. states she will fu as outpatient or otherwise return to ED. questions answered. strict return precautions.

## 2021-06-17 NOTE — ED ADULT TRIAGE NOTE - HEIGHT IN CM
Verified Results  US THYROID [id number] 09:13AM MERRY SANCHEZ     Test Name Result Flag Reference   US THYROID (Report)     Accession #    [id number]    EXAM: US THYROID    CLINICAL INDICATION: 43-year-old female patient presents with history of fullness in the region   of the thyroid gland.    COMPARISON: None.    FINDINGS: The right lobe measures 5.9 x 2.8 x 2.3 cm and the left lobe measures 5.7 x 2.6 x 2.2   cm. There are nodules are within both thyroid lobes. The isthmus is mildly prominent measuring   7 mm in thickness. The largest nodule within the right lobe is noted superiorly measuring   approximately 2.1 x 1.1 x 1.6 cm. It has a heterogeneous appearance with post cystic and solid   components. Three other nodules within the mid and lower portion of the right lobe of thyroid   gland measure 5 x 3 x 6, 9 x 6 x 6 and 6 x 5 x 7 mm in size. The most inferior and lateral one   has both a cystic and solid component as well. There are two hypoechoic nodules noted within   the midportion of the left thyroid gland measuring 9 x 7 x 10 mm and 7 x 5 6 mm. Within the   left isthmus there is a suggestion of a 1.5 cm hypoechoic smoothly marginated nodule. The   thyroid gland demonstrates normal vascularity.    IMPRESSION:     Multiple hypoechoic bilateral thyroid nodules as discussed above, two of which are cystic. A   short-term follow-up ultrasound in six months is suggested to document stability.    **** F I N A L ****    Transcribed By: SLIME   09/19/18 10:52 am    Dictated By:      JULIO GARDINER MD    Electronically Reviewed and Approved By:      JULIO GARDINER MD 09/19/18 10:56 am     HEMOGLOBIN A1C GLYCOSYLATED [id number] 09:00AM DANIELMALCOLM BRANMAROKS     Test Name Result Flag Reference   HEMOGLOBIN A1C GLYH 8.3 % H 4.5-5.6   ----DIABETIC SCREENING---  NON DIABETIC                 <5.7%  INCREASED RISK                5.7-6.4%  DIAGNOSTIC FOR DIABETES      >6.4%     ----DIABETIC CONTROL---     A1C%            eAG mg/dL  6.0            126  6.5            140  7.0            154  7.5            169  8.0            183  8.5            197  9.0            212  9.5            226  10.0           240       Message   Please let me know if patient do not show up on next visit         160.02

## 2021-11-29 ENCOUNTER — EMERGENCY (EMERGENCY)
Facility: HOSPITAL | Age: 61
LOS: 1 days | Discharge: HOME | End: 2021-11-29
Attending: EMERGENCY MEDICINE | Admitting: EMERGENCY MEDICINE
Payer: MEDICAID

## 2021-11-29 ENCOUNTER — NON-APPOINTMENT (OUTPATIENT)
Age: 61
End: 2021-11-29

## 2021-11-29 VITALS
HEART RATE: 95 BPM | DIASTOLIC BLOOD PRESSURE: 83 MMHG | TEMPERATURE: 98 F | SYSTOLIC BLOOD PRESSURE: 125 MMHG | WEIGHT: 212.97 LBS | HEIGHT: 63 IN | OXYGEN SATURATION: 94 % | RESPIRATION RATE: 18 BRPM

## 2021-11-29 VITALS
DIASTOLIC BLOOD PRESSURE: 86 MMHG | RESPIRATION RATE: 18 BRPM | OXYGEN SATURATION: 95 % | SYSTOLIC BLOOD PRESSURE: 133 MMHG | TEMPERATURE: 98 F | HEART RATE: 79 BPM

## 2021-11-29 DIAGNOSIS — J44.9 CHRONIC OBSTRUCTIVE PULMONARY DISEASE, UNSPECIFIED: ICD-10-CM

## 2021-11-29 DIAGNOSIS — R06.02 SHORTNESS OF BREATH: ICD-10-CM

## 2021-11-29 DIAGNOSIS — S39.91XD: Chronic | ICD-10-CM

## 2021-11-29 DIAGNOSIS — R11.0 NAUSEA: ICD-10-CM

## 2021-11-29 DIAGNOSIS — Z79.82 LONG TERM (CURRENT) USE OF ASPIRIN: ICD-10-CM

## 2021-11-29 DIAGNOSIS — R53.1 WEAKNESS: ICD-10-CM

## 2021-11-29 DIAGNOSIS — Z20.822 CONTACT WITH AND (SUSPECTED) EXPOSURE TO COVID-19: ICD-10-CM

## 2021-11-29 DIAGNOSIS — E78.5 HYPERLIPIDEMIA, UNSPECIFIED: ICD-10-CM

## 2021-11-29 DIAGNOSIS — I10 ESSENTIAL (PRIMARY) HYPERTENSION: ICD-10-CM

## 2021-11-29 DIAGNOSIS — Z86.73 PERSONAL HISTORY OF TRANSIENT ISCHEMIC ATTACK (TIA), AND CEREBRAL INFARCTION WITHOUT RESIDUAL DEFICITS: ICD-10-CM

## 2021-11-29 DIAGNOSIS — M79.10 MYALGIA, UNSPECIFIED SITE: ICD-10-CM

## 2021-11-29 LAB
ALBUMIN SERPL ELPH-MCNC: 4.1 G/DL — SIGNIFICANT CHANGE UP (ref 3.3–5)
ALP SERPL-CCNC: 82 U/L — SIGNIFICANT CHANGE UP (ref 40–120)
ALT FLD-CCNC: SIGNIFICANT CHANGE UP U/L (ref 10–45)
ANION GAP SERPL CALC-SCNC: 12 MMOL/L — SIGNIFICANT CHANGE UP (ref 5–17)
APTT BLD: 28.1 SEC — SIGNIFICANT CHANGE UP (ref 27.5–35.5)
AST SERPL-CCNC: SIGNIFICANT CHANGE UP U/L (ref 10–40)
BASOPHILS # BLD AUTO: 0.02 K/UL — SIGNIFICANT CHANGE UP (ref 0–0.2)
BASOPHILS NFR BLD AUTO: 0.4 % — SIGNIFICANT CHANGE UP (ref 0–2)
BILIRUB SERPL-MCNC: 0.3 MG/DL — SIGNIFICANT CHANGE UP (ref 0.2–1.2)
BUN SERPL-MCNC: 12 MG/DL — SIGNIFICANT CHANGE UP (ref 7–23)
CALCIUM SERPL-MCNC: 9 MG/DL — SIGNIFICANT CHANGE UP (ref 8.4–10.5)
CHLORIDE SERPL-SCNC: 107 MMOL/L — SIGNIFICANT CHANGE UP (ref 96–108)
CO2 SERPL-SCNC: 22 MMOL/L — SIGNIFICANT CHANGE UP (ref 22–31)
CREAT SERPL-MCNC: 0.93 MG/DL — SIGNIFICANT CHANGE UP (ref 0.5–1.3)
D DIMER BLD IA.RAPID-MCNC: 239 NG/ML DDU — HIGH
EOSINOPHIL # BLD AUTO: 0.26 K/UL — SIGNIFICANT CHANGE UP (ref 0–0.5)
EOSINOPHIL NFR BLD AUTO: 4.8 % — SIGNIFICANT CHANGE UP (ref 0–6)
GLUCOSE SERPL-MCNC: 153 MG/DL — HIGH (ref 70–99)
HCT VFR BLD CALC: 38 % — SIGNIFICANT CHANGE UP (ref 34.5–45)
HGB BLD-MCNC: 13.2 G/DL — SIGNIFICANT CHANGE UP (ref 11.5–15.5)
IMM GRANULOCYTES NFR BLD AUTO: 0.2 % — SIGNIFICANT CHANGE UP (ref 0–1.5)
INR BLD: 1.11 — SIGNIFICANT CHANGE UP (ref 0.88–1.16)
LYMPHOCYTES # BLD AUTO: 1.24 K/UL — SIGNIFICANT CHANGE UP (ref 1–3.3)
LYMPHOCYTES # BLD AUTO: 23 % — SIGNIFICANT CHANGE UP (ref 13–44)
MCHC RBC-ENTMCNC: 30.6 PG — SIGNIFICANT CHANGE UP (ref 27–34)
MCHC RBC-ENTMCNC: 34.7 GM/DL — SIGNIFICANT CHANGE UP (ref 32–36)
MCV RBC AUTO: 88 FL — SIGNIFICANT CHANGE UP (ref 80–100)
MONOCYTES # BLD AUTO: 0.56 K/UL — SIGNIFICANT CHANGE UP (ref 0–0.9)
MONOCYTES NFR BLD AUTO: 10.4 % — SIGNIFICANT CHANGE UP (ref 2–14)
NEUTROPHILS # BLD AUTO: 3.3 K/UL — SIGNIFICANT CHANGE UP (ref 1.8–7.4)
NEUTROPHILS NFR BLD AUTO: 61.2 % — SIGNIFICANT CHANGE UP (ref 43–77)
NRBC # BLD: 0 /100 WBCS — SIGNIFICANT CHANGE UP (ref 0–0)
NT-PROBNP SERPL-SCNC: 10 PG/ML — SIGNIFICANT CHANGE UP (ref 0–300)
PLATELET # BLD AUTO: 230 K/UL — SIGNIFICANT CHANGE UP (ref 150–400)
POTASSIUM SERPL-MCNC: SIGNIFICANT CHANGE UP MMOL/L (ref 3.5–5.3)
POTASSIUM SERPL-SCNC: SIGNIFICANT CHANGE UP MMOL/L (ref 3.5–5.3)
PROT SERPL-MCNC: 7.6 G/DL — SIGNIFICANT CHANGE UP (ref 6–8.3)
PROTHROM AB SERPL-ACNC: 13.3 SEC — SIGNIFICANT CHANGE UP (ref 10.6–13.6)
RBC # BLD: 4.32 M/UL — SIGNIFICANT CHANGE UP (ref 3.8–5.2)
RBC # FLD: 13.4 % — SIGNIFICANT CHANGE UP (ref 10.3–14.5)
SARS-COV-2 RNA SPEC QL NAA+PROBE: NEGATIVE — SIGNIFICANT CHANGE UP
SODIUM SERPL-SCNC: 141 MMOL/L — SIGNIFICANT CHANGE UP (ref 135–145)
TROPONIN T SERPL-MCNC: 0.01 NG/ML — SIGNIFICANT CHANGE UP (ref 0–0.01)
WBC # BLD: 5.39 K/UL — SIGNIFICANT CHANGE UP (ref 3.8–10.5)
WBC # FLD AUTO: 5.39 K/UL — SIGNIFICANT CHANGE UP (ref 3.8–10.5)

## 2021-11-29 PROCEDURE — 84484 ASSAY OF TROPONIN QUANT: CPT

## 2021-11-29 PROCEDURE — 85610 PROTHROMBIN TIME: CPT

## 2021-11-29 PROCEDURE — 96375 TX/PRO/DX INJ NEW DRUG ADDON: CPT

## 2021-11-29 PROCEDURE — 83880 ASSAY OF NATRIURETIC PEPTIDE: CPT

## 2021-11-29 PROCEDURE — 99285 EMERGENCY DEPT VISIT HI MDM: CPT

## 2021-11-29 PROCEDURE — 87635 SARS-COV-2 COVID-19 AMP PRB: CPT

## 2021-11-29 PROCEDURE — 99284 EMERGENCY DEPT VISIT MOD MDM: CPT | Mod: 25

## 2021-11-29 PROCEDURE — 85025 COMPLETE CBC W/AUTO DIFF WBC: CPT

## 2021-11-29 PROCEDURE — 71045 X-RAY EXAM CHEST 1 VIEW: CPT

## 2021-11-29 PROCEDURE — 36415 COLL VENOUS BLD VENIPUNCTURE: CPT

## 2021-11-29 PROCEDURE — 71045 X-RAY EXAM CHEST 1 VIEW: CPT | Mod: 26

## 2021-11-29 PROCEDURE — 80053 COMPREHEN METABOLIC PANEL: CPT

## 2021-11-29 PROCEDURE — 85379 FIBRIN DEGRADATION QUANT: CPT

## 2021-11-29 PROCEDURE — 96374 THER/PROPH/DIAG INJ IV PUSH: CPT

## 2021-11-29 PROCEDURE — 85730 THROMBOPLASTIN TIME PARTIAL: CPT

## 2021-11-29 RX ORDER — ONDANSETRON 8 MG/1
4 TABLET, FILM COATED ORAL ONCE
Refills: 0 | Status: COMPLETED | OUTPATIENT
Start: 2021-11-29 | End: 2021-11-29

## 2021-11-29 RX ORDER — ONDANSETRON 8 MG/1
1 TABLET, FILM COATED ORAL
Qty: 12 | Refills: 0
Start: 2021-11-29

## 2021-11-29 RX ORDER — FAMOTIDINE 10 MG/ML
1 INJECTION INTRAVENOUS
Qty: 28 | Refills: 0
Start: 2021-11-29 | End: 2021-12-12

## 2021-11-29 RX ORDER — SIMETHICONE 80 MG/1
1 TABLET, CHEWABLE ORAL
Qty: 20 | Refills: 0
Start: 2021-11-29 | End: 2021-12-03

## 2021-11-29 RX ORDER — KETOROLAC TROMETHAMINE 30 MG/ML
15 SYRINGE (ML) INJECTION ONCE
Refills: 0 | Status: DISCONTINUED | OUTPATIENT
Start: 2021-11-29 | End: 2021-11-29

## 2021-11-29 RX ADMIN — ONDANSETRON 4 MILLIGRAM(S): 8 TABLET, FILM COATED ORAL at 13:57

## 2021-11-29 RX ADMIN — Medication 15 MILLIGRAM(S): at 13:58

## 2021-11-29 RX ADMIN — Medication 30 MILLILITER(S): at 13:58

## 2021-11-29 NOTE — ED PROVIDER NOTE - PSYCHIATRIC, MLM
Alert and oriented to person, place, time/situation. anxious/paranoid affect. no apparent risk to self or others.

## 2021-11-29 NOTE — ED PROVIDER NOTE - NSFOLLOWUPINSTRUCTIONS_ED_ALL_ED_FT
Please see your primary care provider and lung specialist for followup.  Call for appointment.  If you have any problems with followup, please call the ED Referral Coordinator at [phone].  Return to the ER if symptoms worsen or other concerns.    Shortness of breath    Shortness of breath (dyspnea) means you have trouble breathing and could indicate a medical problem. Causes include lung disease, heart disease, low amount of red blood cells (anemia), poor physical fitness, being overweight, smoking, etc. Your health care provider today may not be able to find a cause for your shortness of breath after your exam. In this case, it is important to have a follow-up exam with your primary care physician as instructed. If medicines were prescribed, take them as directed for the full length of time directed. Refrain from tobacco products.    SEEK IMMEDIATE MEDICAL CARE IF YOU HAVE ANY OF THE FOLLOWING SYMPTOMS: worsening shortness of breath, chest pain, back pain, abdominal pain, fever, coughing up blood, lightheadedness/dizziness.    Nausea / Vomiting    Nausea is the feeling that you have to vomit. As nausea gets worse, it can lead to vomiting. Vomiting puts you at an increased risk for dehydration. Older adults and people with other diseases or a weak immune system are at higher risk for dehydration. Drink clear fluids in small but frequent amounts as tolerated. Eat bland, easy-to-digest foods in small amounts as tolerated.    SEEK IMMEDIATE MEDICAL CARE IF YOU HAVE ANY OF THE FOLLOWING SYMPTOMS: fever, inability to keep sufficient fluids down, black or bloody vomitus, black or bloody stools, lightheadedness/dizziness, chest pain, severe headache, rash, shortness of breath, cold or clammy skin, confusion, pain with urination, or any signs of dehydration.    Chronic Pain    Chronic pain is a complex condition and the Emergency Department is not the ideal place to manage this condition. Prescription painkillers must be written by your primary care provider or a pain management physician. Avoid activities or triggers that exacerbate your pain.    SEEK IMMEDIATE MEDICAL CARE IF YOU HAVE ANY OF THE FOLLOWING SYMPTOMS: severe chest pain, fainting, vomiting blood, dark or bloody stools, or pain different from your chronic pain.

## 2021-11-29 NOTE — ED PROVIDER NOTE - OBJECTIVE STATEMENT
with COPD, HTN, HLD, CVA, elev LFTs history of COPD, HTN, HLD, CVA, here with "I'm feeling sick." Reports upset stomach, "mariah horses in my lungs," trouble walking, fatigue, generalized weakness, pain all over, more in face/ head. Denies fever, chills, coughing, vomiting, diarrhea. Reports she used to be on trilogy but seemed to make her worse so she stopped. Was at [facility] 4 days ago for same symptoms, told everything ok. Thinks she maybe poisoned by someone but worried people will just think she is crazy. Also says she gave up a bed she liked and it has caused her a lot of stress.

## 2021-11-29 NOTE — ED PROVIDER NOTE - PATIENT PORTAL LINK FT
You can access the FollowMyHealth Patient Portal offered by [facility] by registering at the following website: [email or web address]. By joining [employer]’s FollowMyHealth portal, you will also be able to view your health information using other applications (apps) compatible with our system.

## 2021-11-29 NOTE — ED PROVIDER NOTE - CLINICAL SUMMARY MEDICAL DECISION MAKING FREE TEXT BOX
reports generalized unwell feeling, sob, pain all over, nausea. appears anxious/upset, concerned someone poisoning her. lungs clear on exam. abd soft/ non tender. labs/cxr/ecg ordered. cxr neg for pneumonia, pneumothorax, widened mediastinum to suggest dissection.  troponin neg and ecg non ischemic making acs unlikely.  bnp neg for chf. age adjusted d dimer neg for pe. given toradol/ zofran/ maalox for symptoms with improvement. will have pt f/u with her pmd/ pulm for further management. requested medicine for gas at time of dc. return precautions discussed

## 2021-11-29 NOTE — ED PROVIDER NOTE - NSICDXPASTMEDICALHX_GEN_ALL_CORE_FT
PAST MEDICAL HISTORY:  Chronic obstructive pulmonary disease, unspecified COPD type     Stroke     Trigeminal neuralgia of left side of face

## 2021-11-29 NOTE — ED PROVIDER NOTE - CONSTITUTIONAL, MLM
Well appearing, awake, alert, oriented to person, place, time/situation and appears agitated/ anxious normal...

## 2021-11-29 NOTE — ED ADULT NURSE NOTE - NURSING NEURO LEVEL OF CONSCIOUSNESS
Detail Level: Detailed
Quality 131: Pain Assessment And Follow-Up: Pain assessment NOT documented as being performed, documentation the patient is not eligible for a pain assessment using a standardized tool
Quality 111:Pneumonia Vaccination Status For Older Adults: Pneumococcal Vaccination Previously Received
alert and awake

## 2021-11-29 NOTE — ED ADULT TRIAGE NOTE - OTHER COMPLAINTS
patient BIBEMS from home c/o weakness, nausea, and SOB x 1 month-- patient speaking in full, complete sentences-- EKG in progress-- denies CP, fevers/chills

## 2021-12-08 ENCOUNTER — TRANSCRIPTION ENCOUNTER (OUTPATIENT)
Age: 61
End: 2021-12-08

## 2021-12-09 ENCOUNTER — NON-APPOINTMENT (OUTPATIENT)
Age: 61
End: 2021-12-09

## 2023-01-19 NOTE — ED PROVIDER NOTE - NSFOLLOWUPCLINICS_GEN_ALL_ED_FT
A Family Medicine Doctor  Family Medicine  .  [place]   Phone:   Fax:   Follow Up Time: Libtayo Pregnancy And Lactation Text: This medication is contraindicated in pregnancy and when breast feeding.

## 2023-03-03 NOTE — ED ADULT TRIAGE NOTE - AS O2 DELIVERY
room air Orbicularis Oris Muscle Flap Text: The defect edges were debeveled with a #15 scalpel blade.  Given that the defect affected the competency of the oral sphincter an orbicularis oris muscle flap was deemed most appropriate to restore this competency and normal muscle function.  Using a sterile surgical marker, an appropriate flap was drawn incorporating the defect. The area thus outlined was incised with a #15 scalpel blade.

## 2024-05-30 NOTE — ED CLERICAL - CLERICAL COMMENTS
Galina Davis - 59 F- elev LFTS/ vomiting yesterday-  seen 1 day ago- needs CMP re- checked and reeval
details…

## 2024-09-10 NOTE — ED ADULT TRIAGE NOTE - ARRIVAL FROM
Continue current regimen.  Referral to primary care.  Follow-up at least every 4 to 6 months as she is on dofetilide.   Home

## 2025-03-29 NOTE — REVIEW OF SYSTEMS
[Dyspnea on Exertion] : dyspnea on exertion [Abdominal Pain] : abdominal pain [Joint Pain] : joint pain [Memory Loss] : memory loss [Unsteady Walking] : ataxia [Anxiety] : anxiety [Negative] : Heme/Lymph 2 = A lot of assistance

## 2025-05-24 ENCOUNTER — NON-APPOINTMENT (OUTPATIENT)
Age: 65
End: 2025-05-24

## 2025-05-27 ENCOUNTER — NON-APPOINTMENT (OUTPATIENT)
Age: 65
End: 2025-05-27
